# Patient Record
Sex: FEMALE | Race: WHITE | Employment: OTHER | URBAN - METROPOLITAN AREA
[De-identification: names, ages, dates, MRNs, and addresses within clinical notes are randomized per-mention and may not be internally consistent; named-entity substitution may affect disease eponyms.]

---

## 2017-03-01 ENCOUNTER — HOSPITAL ENCOUNTER (EMERGENCY)
Age: 67
Discharge: HOME | End: 2017-03-01
Attending: EMERGENCY MEDICINE | Admitting: EMERGENCY MEDICINE
Payer: MEDICARE

## 2017-03-01 ENCOUNTER — APPOINTMENT (OUTPATIENT)
Dept: GENERAL RADIOLOGY | Age: 67
End: 2017-03-01
Attending: EMERGENCY MEDICINE
Payer: MEDICARE

## 2017-03-01 VITALS
OXYGEN SATURATION: 97 % | WEIGHT: 180 LBS | HEART RATE: 88 BPM | RESPIRATION RATE: 20 BRPM | DIASTOLIC BLOOD PRESSURE: 82 MMHG | BODY MASS INDEX: 31.89 KG/M2 | TEMPERATURE: 97.5 F | HEIGHT: 63 IN | SYSTOLIC BLOOD PRESSURE: 134 MMHG

## 2017-03-01 DIAGNOSIS — J10.1 INFLUENZA A: Primary | ICD-10-CM

## 2017-03-01 LAB
ANION GAP BLD CALC-SCNC: 10 MMOL/L (ref 3–18)
ATRIAL RATE: 95 BPM
BASOPHILS # BLD AUTO: 0 K/UL (ref 0–0.06)
BASOPHILS # BLD: 0 % (ref 0–2)
BNP SERPL-MCNC: 427 PG/ML (ref 0–900)
BUN SERPL-MCNC: 24 MG/DL (ref 7–18)
BUN/CREAT SERPL: 17 (ref 12–20)
CALCIUM SERPL-MCNC: 8.8 MG/DL (ref 8.5–10.1)
CALCULATED P AXIS, ECG09: 79 DEGREES
CALCULATED R AXIS, ECG10: -5 DEGREES
CALCULATED T AXIS, ECG11: 86 DEGREES
CHLORIDE SERPL-SCNC: 105 MMOL/L (ref 100–108)
CK MB CFR SERPL CALC: 2 % (ref 0–4)
CK MB SERPL-MCNC: 1 NG/ML (ref 5–25)
CK SERPL-CCNC: 51 U/L (ref 26–192)
CO2 SERPL-SCNC: 22 MMOL/L (ref 21–32)
CREAT SERPL-MCNC: 1.4 MG/DL (ref 0.6–1.3)
DIAGNOSIS, 93000: NORMAL
DIFFERENTIAL METHOD BLD: ABNORMAL
EOSINOPHIL # BLD: 0.1 K/UL (ref 0–0.4)
EOSINOPHIL NFR BLD: 2 % (ref 0–5)
ERYTHROCYTE [DISTWIDTH] IN BLOOD BY AUTOMATED COUNT: 14.2 % (ref 11.6–14.5)
FLUAV AG NPH QL IA: POSITIVE
FLUBV AG NOSE QL IA: NEGATIVE
GLUCOSE SERPL-MCNC: 84 MG/DL (ref 74–99)
HCT VFR BLD AUTO: 39.5 % (ref 35–45)
HGB BLD-MCNC: 13.1 G/DL (ref 12–16)
LYMPHOCYTES # BLD AUTO: 16 % (ref 21–52)
LYMPHOCYTES # BLD: 1.1 K/UL (ref 0.9–3.6)
MCH RBC QN AUTO: 29 PG (ref 24–34)
MCHC RBC AUTO-ENTMCNC: 33.2 G/DL (ref 31–37)
MCV RBC AUTO: 87.6 FL (ref 74–97)
MONOCYTES # BLD: 0.8 K/UL (ref 0.05–1.2)
MONOCYTES NFR BLD AUTO: 12 % (ref 3–10)
NEUTS SEG # BLD: 4.7 K/UL (ref 1.8–8)
NEUTS SEG NFR BLD AUTO: 70 % (ref 40–73)
P-R INTERVAL, ECG05: 168 MS
PLATELET # BLD AUTO: 154 K/UL (ref 135–420)
PMV BLD AUTO: 11.7 FL (ref 9.2–11.8)
POTASSIUM SERPL-SCNC: 4.4 MMOL/L (ref 3.5–5.5)
Q-T INTERVAL, ECG07: 378 MS
QRS DURATION, ECG06: 88 MS
QTC CALCULATION (BEZET), ECG08: 475 MS
RBC # BLD AUTO: 4.51 M/UL (ref 4.2–5.3)
SODIUM SERPL-SCNC: 137 MMOL/L (ref 136–145)
TROPONIN I SERPL-MCNC: <0.02 NG/ML (ref 0–0.06)
VENTRICULAR RATE, ECG03: 95 BPM
WBC # BLD AUTO: 6.7 K/UL (ref 4.6–13.2)

## 2017-03-01 PROCEDURE — 93005 ELECTROCARDIOGRAM TRACING: CPT

## 2017-03-01 PROCEDURE — 87804 INFLUENZA ASSAY W/OPTIC: CPT | Performed by: EMERGENCY MEDICINE

## 2017-03-01 PROCEDURE — 99283 EMERGENCY DEPT VISIT LOW MDM: CPT

## 2017-03-01 PROCEDURE — 82550 ASSAY OF CK (CPK): CPT | Performed by: EMERGENCY MEDICINE

## 2017-03-01 PROCEDURE — 71020 XR CHEST PA LAT: CPT

## 2017-03-01 PROCEDURE — 83880 ASSAY OF NATRIURETIC PEPTIDE: CPT | Performed by: PHYSICIAN ASSISTANT

## 2017-03-01 PROCEDURE — 74011250636 HC RX REV CODE- 250/636: Performed by: PHYSICIAN ASSISTANT

## 2017-03-01 PROCEDURE — 80048 BASIC METABOLIC PNL TOTAL CA: CPT | Performed by: EMERGENCY MEDICINE

## 2017-03-01 PROCEDURE — 85025 COMPLETE CBC W/AUTO DIFF WBC: CPT | Performed by: EMERGENCY MEDICINE

## 2017-03-01 RX ORDER — TEMAZEPAM 30 MG/1
30 CAPSULE ORAL
COMMUNITY

## 2017-03-01 RX ORDER — OSELTAMIVIR PHOSPHATE 75 MG/1
75 CAPSULE ORAL 2 TIMES DAILY
Qty: 10 CAP | Refills: 0 | Status: SHIPPED | OUTPATIENT
Start: 2017-03-01 | End: 2017-03-06

## 2017-03-01 RX ORDER — DIAZEPAM 5 MG/1
5 TABLET ORAL
COMMUNITY

## 2017-03-01 RX ORDER — LISINOPRIL 5 MG/1
5 TABLET ORAL DAILY
COMMUNITY

## 2017-03-01 RX ADMIN — SODIUM CHLORIDE 500 ML: 900 INJECTION, SOLUTION INTRAVENOUS at 16:47

## 2017-03-01 NOTE — ED PROVIDER NOTES
HPI Comments: Kari Victoria is a 79 y.o. female with a pertinent history of HTN, pSVT, CHF who presents to the emergency department for evaluation of dry cough, chills, body aches, nasal congestion, sore throat, and HA x 4 days. She states \"I just feel so sick. \"  No recent ill contacts. Has not had a fever, but states she never does. One episode of loose stools last night. Pt denies any dizziness or light headedness, CP or discomfort, SOB,  n/v/c, abd pain, back pain, diaphoresis, melena/hematochezia, dysuria, hematuria, frequency, focal weakness/numbness/tingling, or rash. Patient has no other complaints at this time. PCP: Zenia Broussard MD        Patient is a 79 y.o. female presenting with cough, headaches, sore throat, chills, and shortness of breath. Cough   Associated symptoms include chills, headaches, rhinorrhea, sore throat, myalgias and shortness of breath. Pertinent negatives include no chest pain, no nausea and no vomiting. Headache    Associated symptoms include shortness of breath. Pertinent negatives include no fever, no weakness, no dizziness, no nausea and no vomiting. Sore Throat    Associated symptoms include diarrhea, congestion, headaches, shortness of breath and cough. Pertinent negatives include no vomiting. Chills    Associated symptoms include diarrhea, congestion, headaches, sore throat, cough and shortness of breath. Pertinent negatives include no chest pain, no vomiting and no rash. Shortness of Breath   Associated symptoms include headaches, rhinorrhea, sore throat and cough. Pertinent negatives include no fever, no chest pain, no vomiting, no abdominal pain and no rash. Past Medical History:   Diagnosis Date    CC (collagenous colitis)     Chronic combined systolic and diastolic heart failure ([facility] [address] 7/18/2013    sob on exertion,class3     Endometriosis     Essential hypertension, benign     The hypertension is stable.   UNABLE TO TOLERATE BETA BLOCKER, PT BACK ON LISINOPRIL 10 MG A DAY.  Essential hypertension, benign     The hypertension is stable. UNABLE TO TOLERATE BETA BLOCKER, PT BACK ON LISINOPRIL 10 MG A DAY.  Hypertension     Hypothyroid     Mitral valve insufficiency and aortic valve insufficiency     Mild AI/mild MR, no symptoms    Mitral valve insufficiency and aortic valve insufficiency     Mild AI/mild MR, no symptoms     Neuropathy     Other primary cardiomyopathies ([address] 2013    ef decreased again to 35-40% unclear non ischemic etiology     Other specified cardiac dysrhythmias(427.89)     OCC PVC, one 4 beat SVT    Palpitations     10/12 recurrent post ablation of SVT in ; EKG with S Tach in 130s today; obtain results of recent labs from PCP, obtain results if recent Holter from Dr. Christiana Chauhan Paroxysmal supraventricular tachycardia ([facility] [address]      ablation    Paroxysmal supraventricular tachycardia ([facility] [address]      ablation        Past Surgical History:   Procedure Laterality Date    CARDIAC SURG PROCEDURE UNLIST      ablation;  for SVT - slow pathway; Dr. Dejah Lidnsey    HX BREAST REDUCTION      HX CARPAL TUNNEL RELEASE      HX  SECTION      x 2     HX CHOLECYSTECTOMY      HX DILATION AND CURETTAGE      HX HERNIA REPAIR      Incisional herniorrhaphy    HX ORTHOPAEDIC      right ankle and left foot    HX TONSILLECTOMY           Family History:   Problem Relation Age of Onset    Heart Attack Other     Hypertension Other     Heart Attack Mother 52    Heart Attack Father 61    Stroke Neg Hx     Sudden Death Neg Hx        Social History     Social History    Marital status:      Spouse name: N/A    Number of children: N/A    Years of education: N/A     Occupational History    Not on file.      Social History Main Topics    Smoking status: Never Smoker    Smokeless tobacco: Not on file    Alcohol use No    Drug use: No    Sexual activity: Not on file     Other Topics Concern    Not on file     Social History Narrative         ALLERGIES: Latex; Neuromuscular blockers, steroidal; Adhesive; Augmentin [amoxicillin-pot clavulanate]; Coreg [carvedilol]; Digoxin; Metoprolol; and Toprol xl [metoprolol succinate]    Review of Systems   Constitutional: Positive for chills. Negative for fever. HENT: Positive for congestion, rhinorrhea and sore throat. Respiratory: Positive for cough and shortness of breath. Cardiovascular: Negative for chest pain. Gastrointestinal: Positive for diarrhea. Negative for abdominal pain, blood in stool, constipation, nausea and vomiting. Genitourinary: Negative for dysuria, frequency and hematuria. Musculoskeletal: Positive for myalgias. Negative for back pain. Skin: Negative for rash and wound. Neurological: Positive for headaches. Negative for dizziness, weakness and light-headedness. Vitals:    03/01/17 1232   BP: 136/85   Pulse: 95   Resp: 20   Temp: 97.5 °F (36.4 °C)   SpO2: 98%   Weight: 81.6 kg (180 lb)   Height: 5' 3\" (1.6 m)            Physical Exam   Constitutional: She is oriented to person, place, and time. She appears well-developed and well-nourished. No distress. HENT:   Head: Normocephalic and atraumatic. Nose: Rhinorrhea present. Mouth/Throat: Oropharynx is clear and moist. No oropharyngeal exudate. Eyes: Conjunctivae are normal. Right eye exhibits no discharge. Left eye exhibits no discharge. Neck: Normal range of motion. Neck supple. No thyromegaly present. Cardiovascular: Normal rate and intact distal pulses. Exam reveals no gallop and no friction rub. No murmur heard. Pulmonary/Chest: Effort normal and breath sounds normal. No respiratory distress. She has no wheezes. She has no rales. She exhibits no tenderness. Lungs CTAB   Lymphadenopathy:     She has no cervical adenopathy. Neurological: She is alert and oriented to person, place, and time. Skin: Skin is warm and dry. No rash noted. She is not diaphoretic.    Psychiatric: She has a normal mood and affect. Her behavior is normal.   Nursing note and vitals reviewed. MDM  Number of Diagnoses or Management Options  Influenza A: new and requires workup  Diagnosis management comments: Differential Diagnosis:  influenza, mononucleosis, acute bronchitis, URI, streptococcal pharyngitis, pertussis, pneumonia, asthma exacerbation, allergic rhinitis      Plan:  Pt presents in NAD, vitals wnl. Exam and HPI consistent with viral URI. Flu positive. Mildly worsened GFR - patient states she feels dehydrated. Labs are otherwise unremarkable. CXR negative. Given comorbidities, will treat with tamiflu. Small bolus here. At this time, patient is stable and appropriate for discharge home. Patient demonstrates understanding of current diagnoses and is in agreement with the treatment plan. They are advised that while the likelihood of serious underlying condition is low at this point given the evaluation performed today, we cannot fully rule it out. They are advised to immediately return with any new symptoms or worsening of current condition. All questions have been answered. Patient is given educational material regarding their diagnoses, including danger symptoms and when to return to the ED.   Follow-up with PCP         Amount and/or Complexity of Data Reviewed  Clinical lab tests: ordered and reviewed  Tests in the radiology section of CPT®: ordered and reviewed  Review and summarize past medical records: yes    Risk of Complications, Morbidity, and/or Mortality  Presenting problems: moderate  Diagnostic procedures: moderate  Management options: moderate    Patient Progress  Patient progress: improved    ED Course       Procedures    -------------------------------------------------------------------------------------------------------------------  Orders:  Orders Placed This Encounter    INFLUENZA A & B AG (RAPID TEST)     Standing Status:   Standing     Number of Occurrences:   1    XR CHEST PA LAT     Standing Status:   Standing     Number of Occurrences:   1     Order Specific Question:   Transport     Answer:   Ambulatory [1]     Order Specific Question:   Reason for Exam     Answer:   short of breath    CBC WITH AUTOMATED DIFF     Standing Status:   Standing     Number of Occurrences:   1    METABOLIC PANEL, BASIC     Standing Status:   Standing     Number of Occurrences:   1    CARDIAC PANEL,(CK, CKMB & TROPONIN)     Standing Status:   Standing     Number of Occurrences:   1    Pro BNP     Standing Status:   Standing     Number of Occurrences:   1    CARDIAC MONITORING     Standing Status:   Standing     Number of Occurrences:   1     Order Specific Question:   Type: Answer:   Bedside    EKG, 12 LEAD, INITIAL     Standing Status:   Standing     Number of Occurrences:   1     Order Specific Question:   Reason for Exam:     Answer:   short of breath    lisinopril (PRINIVIL, ZESTRIL) 5 mg tablet     Sig: Take 5 mg by mouth daily.  diazePAM (VALIUM) 5 mg tablet     Sig: Take 5 mg by mouth every six (6) hours as needed for Anxiety.  temazepam (RESTORIL) 30 mg capsule     Sig: Take 30 mg by mouth nightly as needed for Sleep.  sodium chloride 0.9 % bolus infusion 500 mL    oseltamivir (TAMIFLU) 75 mg capsule     Sig: Take 1 Cap by mouth two (2) times a day for 5 days.      Dispense:  10 Cap     Refill:  0        Lab Results:   Recent Results (from the past 12 hour(s))   EKG, 12 LEAD, INITIAL    Collection Time: 03/01/17 12:46 PM   Result Value Ref Range    Ventricular Rate 95 BPM    Atrial Rate 95 BPM    P-R Interval 168 ms    QRS Duration 88 ms    Q-T Interval 378 ms    QTC Calculation (Bezet) 475 ms    Calculated P Axis 79 degrees    Calculated R Axis -5 degrees    Calculated T Axis 86 degrees    Diagnosis       Normal sinus rhythm  Prolonged QT  Abnormal ECG  When compared with ECG of 30-OCT-2015 11:36,  No significant change was found     CBC WITH AUTOMATED DIFF Collection Time: 03/01/17  1:05 PM   Result Value Ref Range    WBC 6.7 4.6 - 13.2 K/uL    RBC 4.51 4.20 - 5.30 M/uL    HGB 13.1 12.0 - 16.0 g/dL    HCT 39.5 35.0 - 45.0 %    MCV 87.6 74.0 - 97.0 FL    MCH 29.0 24.0 - 34.0 PG    MCHC 33.2 31.0 - 37.0 g/dL    RDW 14.2 11.6 - 14.5 %    PLATELET 224 392 - 919 K/uL    MPV 11.7 9.2 - 11.8 FL    NEUTROPHILS 70 40 - 73 %    LYMPHOCYTES 16 (L) 21 - 52 %    MONOCYTES 12 (H) 3 - 10 %    EOSINOPHILS 2 0 - 5 %    BASOPHILS 0 0 - 2 %    ABS. NEUTROPHILS 4.7 1.8 - 8.0 K/UL    ABS. LYMPHOCYTES 1.1 0.9 - 3.6 K/UL    ABS. MONOCYTES 0.8 0.05 - 1.2 K/UL    ABS. EOSINOPHILS 0.1 0.0 - 0.4 K/UL    ABS. BASOPHILS 0.0 0.0 - 0.06 K/UL    DF AUTOMATED     METABOLIC PANEL, BASIC    Collection Time: 03/01/17  1:05 PM   Result Value Ref Range    Sodium 137 136 - 145 mmol/L    Potassium 4.4 3.5 - 5.5 mmol/L    Chloride 105 100 - 108 mmol/L    CO2 22 21 - 32 mmol/L    Anion gap 10 3.0 - 18 mmol/L    Glucose 84 74 - 99 mg/dL    BUN 24 (H) 7.0 - 18 MG/DL    Creatinine 1.40 (H) 0.6 - 1.3 MG/DL    BUN/Creatinine ratio 17 12 - 20      GFR est AA 45 (L) >60 ml/min/1.73m2    GFR est non-AA 38 (L) >60 ml/min/1.73m2    Calcium 8.8 8.5 - 10.1 MG/DL   CARDIAC PANEL,(CK, CKMB & TROPONIN)    Collection Time: 03/01/17  1:05 PM   Result Value Ref Range    CK 51 26 - 192 U/L    CK - MB 1.0 <3.6 ng/ml    CK-MB Index 2.0 0.0 - 4.0 %    Troponin-I, Qt. <0.02 0.00 - 0.06 NG/ML   PRO-BNP    Collection Time: 03/01/17  1:05 PM   Result Value Ref Range    NT pro- 0 - 900 PG/ML   INFLUENZA A & B AG (RAPID TEST)    Collection Time: 03/01/17  1:15 PM   Result Value Ref Range    Influenza A Antigen POSITIVE (A) NEG      Influenza B Antigen NEGATIVE  NEG         Radiology Results:  XR CHEST PA LAT   Final Result   IMPRESSION:  No radiographic evidence for acute cardiopulmonary process. .       Progress Notes:  4:49 PM:  Rosa Maria Lemus PA-C was at the pt's bedside, assessed the pt and answered the pt's questions regarding treatment.    -------------------------------------------------------------------------------------------------------------------    Disposition:  Diagnosis:   1. Influenza A        Disposition: [place] Home    Follow-up Information     Follow up With Details Comments Contact Info    Filiberto Ornelas MD Call in 2 days For follow-up [address] [facility]      [id number] [place] [address] EMERGENCY DEPT Go to As needed, If symptoms worsen [address] [facility]  [phone]          Patient's Medications   Start Taking    OSELTAMIVIR (TAMIFLU) 75 MG CAPSULE    Take 1 Cap by mouth two (2) times a day for 5 days. Continue Taking    BUPROPION SR (WELLBUTRIN SR) 150 MG SR TABLET    Take  by mouth two (2) times a day. DESVENLAFAXINE SR (PRISTIQ) 50 MG TABLET    Take 50 mg by mouth daily. DIAZEPAM (VALIUM) 5 MG TABLET    Take 5 mg by mouth every six (6) hours as needed for Anxiety. FOLIC ACID (FOLVITE) 1 MG TABLET    Take  by mouth daily. LAMOTRIGINE (LAMICTAL) 100 MG TABLET    Take 100 mg by mouth daily. LISINOPRIL (PRINIVIL, ZESTRIL) 5 MG TABLET    Take 5 mg by mouth daily. OMEPRAZOLE (PRILOSEC) 40 MG CAPSULE    Take 40 mg by mouth daily. TEMAZEPAM (RESTORIL) 30 MG CAPSULE    Take 30 mg by mouth nightly as needed for Sleep. These Medications have changed    No medications on file   Stop Taking    ALPRAZOLAM (XANAX) 2 MG TABLET    Take  by mouth daily. Takes 1.5 daily. AMITRIPTYLINE (ELAVIL) 25 MG TABLET    Take 50 mg by mouth nightly. ATENOLOL (TENORMIN) 50 MG TABLET    Take 1 Tab by mouth daily. BUSPIRONE (BUSPAR) 15 MG TABLET    Take 15 mg by mouth three (3) times daily. FAMOTIDINE (PEPCID) 40 MG TABLET    Take 40 mg by mouth two (2) times a day. FUROSEMIDE (LASIX) 20 MG TABLET    Take 1 Tab by mouth daily as needed (SWELLING). LOSARTAN (COZAAR) 50 MG TABLET    Take  by mouth daily.

## 2017-03-01 NOTE — ED TRIAGE NOTES
Patient states onset of cough, chills, body aches, sore throat and shortness of breath on Saturday. She states hx of cardiac valve replacement. Patient states:  \" I am always short of breath, but this is worse\".

## 2017-03-01 NOTE — DISCHARGE INSTRUCTIONS
Influenza (Flu): Care Instructions  Your Care Instructions  Influenza (flu) is an infection in the lungs and breathing passages. It is caused by the influenza virus. There are different strains, or types, of the flu virus from year to year. Unlike the common cold, the flu comes on suddenly and the symptoms, such as a cough, congestion, fever, chills, fatigue, aches, and pains, are more severe. These symptoms may last up to 10 days. Although the flu can make you feel very sick, it usually doesn't cause serious health problems. Home treatment is usually all you need for flu symptoms. But your doctor may prescribe antiviral medicine to prevent other health problems, such as pneumonia, from developing. Older people and those who have a long-term health condition, such as lung disease, are most at risk for having pneumonia or other health problems. Follow-up care is a key part of your treatment and safety. Be sure to make and go to all appointments, and call your doctor if you are having problems. Its also a good idea to know your test results and keep a list of the medicines you take. How can you care for yourself at home? · Get plenty of rest.  · Drink plenty of fluids, enough so that your urine is light yellow or clear like water. If you have kidney, heart, or liver disease and have to limit fluids, talk with your doctor before you increase the amount of fluids you drink. · Take an over-the-counter pain medicine if needed, such as acetaminophen (Tylenol), ibuprofen (Advil, Motrin), or naproxen (Aleve), to relieve fever, headache, and muscle aches. Read and follow all instructions on the label. No one younger than 20 should take aspirin. It has been linked to Reye syndrome, a serious illness. · Do not smoke. Smoking can make the flu worse. If you need help quitting, talk to your doctor about stop-smoking programs and medicines. These can increase your chances of quitting for good.   · Breathe moist air from a hot shower or from a sink filled with hot water to help clear a stuffy nose. · Before you use cough and cold medicines, check the label. These medicines may not be safe for young children or for people with certain health problems. · If the skin around your nose and lips becomes sore, put some petroleum jelly on the area. · To ease coughing:  ¨ Drink fluids to soothe a scratchy throat. ¨ Suck on cough drops or plain hard candy. ¨ Take an over-the-counter cough medicine that contains dextromethorphan to help you get some sleep. Read and follow all instructions on the label. ¨ Raise your head at night with an extra pillow. This may help you rest if coughing keeps you awake. · Take any prescribed medicine exactly as directed. Call your doctor if you think you are having a problem with your medicine. To avoid spreading the flu  · Wash your hands regularly, and keep your hands away from your face. · Stay home from school, work, and other public places until you are feeling better and your fever has been gone for at least 24 hours. The fever needs to have gone away on its own without the help of medicine. · Ask people living with you to talk to their doctors about preventing the flu. They may get antiviral medicine to keep from getting the flu from you. · To prevent the flu in the future, get a flu vaccine every fall. Encourage people living with you to get the vaccine. · Cover your mouth when you cough or sneeze. When should you call for help? Call 911 anytime you think you may need emergency care. For example, call if:  · You have severe trouble breathing. Call your doctor now or seek immediate medical care if:  · You have new or worse trouble breathing. · You seem to be getting much sicker. · You feel very sleepy or confused. · You have a new or higher fever. · You get a new rash.   Watch closely for changes in your health, and be sure to contact your doctor if:  · You begin to get better and then get worse. · You are not getting better after 1 week. Where can you learn more? Go to http://tristan-audra.info/. Enter W444 in the search box to learn more about \"Influenza (Flu): Care Instructions. \"  Current as of: May 23, 2016  Content Version: 11.1  © 2050-1783 [employer]. Care instructions adapted under license by [employer] (which disclaims liability or warranty for this information). If you have questions about a medical condition or this instruction, always ask your healthcare professional. [address] any warranty or liability for your use of this information.

## 2017-07-11 ENCOUNTER — HOSPITAL ENCOUNTER (EMERGENCY)
Age: 67
Discharge: HOME | End: 2017-07-11
Attending: EMERGENCY MEDICINE | Admitting: EMERGENCY MEDICINE
Payer: MEDICARE

## 2017-07-11 ENCOUNTER — APPOINTMENT (OUTPATIENT)
Dept: GENERAL RADIOLOGY | Age: 67
End: 2017-07-11
Attending: NURSE PRACTITIONER
Payer: MEDICARE

## 2017-07-11 VITALS
DIASTOLIC BLOOD PRESSURE: 79 MMHG | BODY MASS INDEX: 32.11 KG/M2 | TEMPERATURE: 98 F | HEIGHT: 63 IN | SYSTOLIC BLOOD PRESSURE: 143 MMHG | WEIGHT: 181.2 LBS | HEART RATE: 91 BPM | OXYGEN SATURATION: 100 % | RESPIRATION RATE: 18 BRPM

## 2017-07-11 DIAGNOSIS — R53.83 FATIGUE, UNSPECIFIED TYPE: Primary | ICD-10-CM

## 2017-07-11 LAB
ANION GAP BLD CALC-SCNC: 8 MMOL/L (ref 3–18)
APPEARANCE UR: CLEAR
BASOPHILS # BLD AUTO: 0 K/UL (ref 0–0.06)
BASOPHILS # BLD: 1 % (ref 0–2)
BILIRUB UR QL: NEGATIVE
BNP SERPL-MCNC: 369 PG/ML (ref 0–900)
BUN SERPL-MCNC: 15 MG/DL (ref 7–18)
BUN/CREAT SERPL: 11 (ref 12–20)
CALCIUM SERPL-MCNC: 8.7 MG/DL (ref 8.5–10.1)
CHLORIDE SERPL-SCNC: 105 MMOL/L (ref 100–108)
CK MB CFR SERPL CALC: 1.3 % (ref 0–4)
CK MB SERPL-MCNC: 2.4 NG/ML (ref 5–25)
CK SERPL-CCNC: 188 U/L (ref 26–192)
CO2 SERPL-SCNC: 26 MMOL/L (ref 21–32)
COLOR UR: YELLOW
CREAT SERPL-MCNC: 1.33 MG/DL (ref 0.6–1.3)
DIFFERENTIAL METHOD BLD: NORMAL
EOSINOPHIL # BLD: 0 K/UL (ref 0–0.4)
EOSINOPHIL NFR BLD: 0 % (ref 0–5)
ERYTHROCYTE [DISTWIDTH] IN BLOOD BY AUTOMATED COUNT: 13.7 % (ref 11.6–14.5)
GLUCOSE BLD STRIP.AUTO-MCNC: 93 MG/DL (ref 70–110)
GLUCOSE SERPL-MCNC: 90 MG/DL (ref 74–99)
GLUCOSE UR STRIP.AUTO-MCNC: NEGATIVE MG/DL
HCT VFR BLD AUTO: 41.5 % (ref 35–45)
HGB BLD-MCNC: 13.8 G/DL (ref 12–16)
HGB UR QL STRIP: NEGATIVE
KETONES UR QL STRIP.AUTO: NEGATIVE MG/DL
LEUKOCYTE ESTERASE UR QL STRIP.AUTO: NEGATIVE
LYMPHOCYTES # BLD AUTO: 27 % (ref 21–52)
LYMPHOCYTES # BLD: 1.7 K/UL (ref 0.9–3.6)
MCH RBC QN AUTO: 28.6 PG (ref 24–34)
MCHC RBC AUTO-ENTMCNC: 33.3 G/DL (ref 31–37)
MCV RBC AUTO: 86.1 FL (ref 74–97)
MONOCYTES # BLD: 0.6 K/UL (ref 0.05–1.2)
MONOCYTES NFR BLD AUTO: 10 % (ref 3–10)
NEUTS SEG # BLD: 3.8 K/UL (ref 1.8–8)
NEUTS SEG NFR BLD AUTO: 62 % (ref 40–73)
NITRITE UR QL STRIP.AUTO: NEGATIVE
PH UR STRIP: 7 [PH] (ref 5–8)
PLATELET # BLD AUTO: 172 K/UL (ref 135–420)
PMV BLD AUTO: 11.1 FL (ref 9.2–11.8)
POTASSIUM SERPL-SCNC: 4.1 MMOL/L (ref 3.5–5.5)
PROT UR STRIP-MCNC: NEGATIVE MG/DL
RBC # BLD AUTO: 4.82 M/UL (ref 4.2–5.3)
SODIUM SERPL-SCNC: 139 MMOL/L (ref 136–145)
SP GR UR REFRACTOMETRY: 1.01 (ref 1–1.03)
TROPONIN I SERPL-MCNC: <0.02 NG/ML (ref 0–0.06)
TSH SERPL DL<=0.05 MIU/L-ACNC: 2.4 UIU/ML (ref 0.36–3.74)
UROBILINOGEN UR QL STRIP.AUTO: 0.2 EU/DL (ref 0.2–1)
WBC # BLD AUTO: 6.1 K/UL (ref 4.6–13.2)

## 2017-07-11 PROCEDURE — 71020 XR CHEST PA LAT: CPT

## 2017-07-11 PROCEDURE — 84443 ASSAY THYROID STIM HORMONE: CPT | Performed by: NURSE PRACTITIONER

## 2017-07-11 PROCEDURE — 85025 COMPLETE CBC W/AUTO DIFF WBC: CPT | Performed by: NURSE PRACTITIONER

## 2017-07-11 PROCEDURE — 74011250636 HC RX REV CODE- 250/636: Performed by: NURSE PRACTITIONER

## 2017-07-11 PROCEDURE — 82550 ASSAY OF CK (CPK): CPT | Performed by: NURSE PRACTITIONER

## 2017-07-11 PROCEDURE — 99284 EMERGENCY DEPT VISIT MOD MDM: CPT

## 2017-07-11 PROCEDURE — 80048 BASIC METABOLIC PNL TOTAL CA: CPT | Performed by: NURSE PRACTITIONER

## 2017-07-11 PROCEDURE — 82962 GLUCOSE BLOOD TEST: CPT

## 2017-07-11 PROCEDURE — 83880 ASSAY OF NATRIURETIC PEPTIDE: CPT | Performed by: NURSE PRACTITIONER

## 2017-07-11 PROCEDURE — 93005 ELECTROCARDIOGRAM TRACING: CPT

## 2017-07-11 PROCEDURE — 81003 URINALYSIS AUTO W/O SCOPE: CPT | Performed by: NURSE PRACTITIONER

## 2017-07-11 RX ORDER — FLUOXETINE HYDROCHLORIDE 20 MG/1
20 CAPSULE ORAL DAILY
COMMUNITY

## 2017-07-11 RX ADMIN — SODIUM CHLORIDE 500 ML: 900 INJECTION, SOLUTION INTRAVENOUS at 17:15

## 2017-07-11 NOTE — ED PROVIDER NOTES
HPI Comments:   4:24 PM   79 y.o. female presents to ED C/O fatigue, thirst.  Patient has a HX of hypothyroid, HTN, neuropathy,  HTN, mitral valve insufficiency, aortic insufficiency, SVT, CHF, cholecystectomy, hernia repair, cardiac ablation, breast reduction. Patient reports increased thirst x 2 weeks, associated with feeling fatigue/ tired, and feeling like she is shaking on the inside. Patient reports it takes her long to find the right word but denies confusion. Patient denies abdominal pain, SOB, CP, N/V/D, dysuria. Patient is a nonsmoker. Pt denies any other sxs or complaints. Written by Apollo MCCORMICK      The history is provided by the patient. History limited by: No language barrier. Past Medical History:   Diagnosis Date    CC (collagenous colitis)     Chronic combined systolic and diastolic heart failure ([address] 7/18/2013    sob on exertion,class3     Endometriosis     Essential hypertension, benign     The hypertension is stable. UNABLE TO TOLERATE BETA BLOCKER, PT BACK ON LISINOPRIL 10 MG A DAY.  Essential hypertension, benign     The hypertension is stable. UNABLE TO TOLERATE BETA BLOCKER, PT BACK ON LISINOPRIL 10 MG A DAY.      Hypertension     Hypothyroid     Mitral valve insufficiency and aortic valve insufficiency     Mild AI/mild MR, no symptoms    Mitral valve insufficiency and aortic valve insufficiency     Mild AI/mild MR, no symptoms     Neuropathy (HCC)     Other primary cardiomyopathies 7/18/2013    ef decreased again to 35-40% unclear non ischemic etiology     Other specified cardiac dysrhythmias     OCC PVC, one 4 beat SVT    Palpitations     10/12 recurrent post ablation of SVT in 9/12; EKG with S Tach in 130s today; obtain results of recent labs from PCP, obtain results if recent Holter from Dr. Sandie Ho Paroxysmal supraventricular tachycardia ([address]     9/12 ablation    Paroxysmal supraventricular tachycardia ([address]     9/12 ablation        Past Surgical History:   Procedure Laterality Date    CARDIAC SURG PROCEDURE UNLIST      ablation;  for SVT - slow pathway; Dr. Panda Boswell    HX BREAST REDUCTION      HX CARPAL TUNNEL RELEASE      HX  SECTION      x 2     HX CHOLECYSTECTOMY      HX DILATION AND CURETTAGE      HX HERNIA REPAIR      Incisional herniorrhaphy    HX ORTHOPAEDIC      right ankle and left foot    HX TONSILLECTOMY           Family History:   Problem Relation Age of Onset    Heart Attack Mother 52    Heart Attack Father 61    Heart Attack Other     Hypertension Other     Stroke Neg Hx     Sudden Death Neg Hx        Social History     Social History    Marital status:      Spouse name: N/A    Number of children: N/A    Years of education: N/A     Occupational History    Not on file. Social History Main Topics    Smoking status: Never Smoker    Smokeless tobacco: Not on file    Alcohol use No    Drug use: No    Sexual activity: Not on file     Other Topics Concern    Not on file     Social History Narrative         ALLERGIES: Latex; Neuromuscular blockers, steroidal; Adhesive; Augmentin [amoxicillin-pot clavulanate]; Coreg [carvedilol]; Digoxin; Metoprolol; and Toprol xl [metoprolol succinate]    Review of Systems   Constitutional: Positive for appetite change and fatigue. Negative for fever. HENT: Negative for congestion, rhinorrhea and sore throat. Respiratory: Negative for cough, shortness of breath and wheezing. Cardiovascular: Negative for chest pain and leg swelling. Gastrointestinal: Negative for abdominal pain, constipation, diarrhea, nausea and vomiting. Genitourinary: Negative for dysuria. Musculoskeletal: Negative for arthralgias and back pain. Neurological: Negative for dizziness, syncope and headaches.        Vitals:    17 1606 17 1733   BP: 134/88 143/79   Pulse: (!) 112 91   Resp: 20 18   Temp: 98 °F (36.7 °C)    SpO2: 100% 100%   Weight: 82.2 kg (181 lb 3.2 oz) Height: 5' 3\" (1.6 m)             Physical Exam   Constitutional: She is oriented to person, place, and time. She appears well-developed and well-nourished. No distress. HENT:   Mouth/Throat: Mucous membranes are dry. Eyes: Conjunctivae and EOM are normal.   Cardiovascular: Normal rate and regular rhythm. Murmur heard. Pulmonary/Chest: Effort normal and breath sounds normal. No respiratory distress. She has no wheezes. She has no rales. Abdominal: Soft. Bowel sounds are normal. She exhibits no distension. There is no tenderness. There is no rebound and no guarding. NO CVA tenderness to percussion    Musculoskeletal: Normal range of motion. Neurological: She is alert and oriented to person, place, and time. She exhibits normal muscle tone. Coordination normal.   NIHSS - 0   Skin: Skin is warm and dry. No rash noted. She is not diaphoretic. No erythema. No pallor. Nursing note and vitals reviewed. MDM  Number of Diagnoses or Management Options  Fatigue, unspecified type:   Diagnosis management comments: DDX - dehydration, anemia, depression, medication reaction, electrolyte abnormality, CHF, ACS, hypothyroid    MDM:  Plan - CBC, BMP, UA , cardiac panel, EKG, chest xray, TSH  Progress - patient's UA and CBC is normal, no abnormal findings, BMP - renal function improved form previous, TSH is WNL, cardiac panel is WNL, chest xray - no acute cardiopulmonary finding, BNP is normal  6:05 PM Patient informed of results and given a copy. Extensive discussion with patient regarding results and possible cause of symptoms. Patient recently started prozac approx 8 weeks ago - referred her back to psych for further evaluation, possible medication reaction. Patient educated to return to the ED for any new or worsening symptoms. Patient denies questions.         Amount and/or Complexity of Data Reviewed  Clinical lab tests: ordered and reviewed  Tests in the radiology section of CPT®: ordered and reviewed      ED Course       Procedures               RESULTS:    XR CHEST PA LAT   Final Result          Labs Reviewed   METABOLIC PANEL, BASIC - Abnormal; Notable for the following:        Result Value    Creatinine 1.33 (*)     BUN/Creatinine ratio 11 (*)     GFR est AA 48 (*)     GFR est non-AA 40 (*)     All other components within normal limits   CBC WITH AUTOMATED DIFF   URINALYSIS W/ RFLX MICROSCOPIC   TSH 3RD GENERATION   CARDIAC PANEL,(CK, CKMB & TROPONIN)   NT-PRO BNP   GLUCOSE, POC   POC GLUCOSE       Recent Results (from the past 12 hour(s))   GLUCOSE, POC    Collection Time: 07/11/17  4:15 PM   Result Value Ref Range    Glucose (POC) 93 70 - 110 mg/dL   CBC WITH AUTOMATED DIFF    Collection Time: 07/11/17  4:30 PM   Result Value Ref Range    WBC 6.1 4.6 - 13.2 K/uL    RBC 4.82 4.20 - 5.30 M/uL    HGB 13.8 12.0 - 16.0 g/dL    HCT 41.5 35.0 - 45.0 %    MCV 86.1 74.0 - 97.0 FL    MCH 28.6 24.0 - 34.0 PG    MCHC 33.3 31.0 - 37.0 g/dL    RDW 13.7 11.6 - 14.5 %    PLATELET 226 848 - 476 K/uL    MPV 11.1 9.2 - 11.8 FL    NEUTROPHILS 62 40 - 73 %    LYMPHOCYTES 27 21 - 52 %    MONOCYTES 10 3 - 10 %    EOSINOPHILS 0 0 - 5 %    BASOPHILS 1 0 - 2 %    ABS. NEUTROPHILS 3.8 1.8 - 8.0 K/UL    ABS. LYMPHOCYTES 1.7 0.9 - 3.6 K/UL    ABS. MONOCYTES 0.6 0.05 - 1.2 K/UL    ABS. EOSINOPHILS 0.0 0.0 - 0.4 K/UL    ABS.  BASOPHILS 0.0 0.0 - 0.06 K/UL    DF AUTOMATED     METABOLIC PANEL, BASIC    Collection Time: 07/11/17  4:30 PM   Result Value Ref Range    Sodium 139 136 - 145 mmol/L    Potassium 4.1 3.5 - 5.5 mmol/L    Chloride 105 100 - 108 mmol/L    CO2 26 21 - 32 mmol/L    Anion gap 8 3.0 - 18 mmol/L    Glucose 90 74 - 99 mg/dL    BUN 15 7.0 - 18 MG/DL    Creatinine 1.33 (H) 0.6 - 1.3 MG/DL    BUN/Creatinine ratio 11 (L) 12 - 20      GFR est AA 48 (L) >60 ml/min/1.73m2    GFR est non-AA 40 (L) >60 ml/min/1.73m2    Calcium 8.7 8.5 - 10.1 MG/DL   TSH 3RD GENERATION    Collection Time: 07/11/17  4:30 PM   Result Value Ref Range    TSH 2.40 0.36 - 3.74 uIU/mL   CARDIAC PANEL,(CK, CKMB & TROPONIN)    Collection Time: 07/11/17  4:30 PM   Result Value Ref Range     26 - 192 U/L    CK - MB 2.4 <3.6 ng/ml    CK-MB Index 1.3 0.0 - 4.0 %    Troponin-I, Qt. <0.02 0.00 - 0.06 NG/ML   NT-PRO BNP    Collection Time: 07/11/17  4:30 PM   Result Value Ref Range    NT pro- 0 - 900 PG/ML   EKG, 12 LEAD, INITIAL    Collection Time: 07/11/17  4:39 PM   Result Value Ref Range    Ventricular Rate 92 BPM    Atrial Rate 92 BPM    P-R Interval 164 ms    QRS Duration 86 ms    Q-T Interval 398 ms    QTC Calculation (Bezet) 492 ms    Calculated P Axis 67 degrees    Calculated R Axis -21 degrees    Calculated T Axis 77 degrees    Diagnosis       Normal sinus rhythm  Inferior infarct , age undetermined  Abnormal ECG  When compared with ECG of 01-MAR-2017 12:46,  No significant change was found     URINALYSIS W/ RFLX MICROSCOPIC    Collection Time: 07/11/17  4:55 PM   Result Value Ref Range    Color YELLOW      Appearance CLEAR      Specific gravity 1.008 1.005 - 1.030      pH (UA) 7.0 5.0 - 8.0      Protein NEGATIVE  NEG mg/dL    Glucose NEGATIVE  NEG mg/dL    Ketone NEGATIVE  NEG mg/dL    Bilirubin NEGATIVE  NEG      Blood NEGATIVE  NEG      Urobilinogen 0.2 0.2 - 1.0 EU/dL    Nitrites NEGATIVE  NEG      Leukocyte Esterase NEGATIVE  NEG         PROGRESS NOTE:   4:24 PM   Initial assessment completed. Written by Sergio MCCORMICK     DISCHARGE NOTE:  6:02 PM   Marcelo Roman  results have been reviewed with her. She has been counseled regarding her diagnosis, treatment, and plan. She verbally conveys understanding and agreement of the signs, symptoms, diagnosis, treatment and prognosis and additionally agrees to follow up as discussed. She also agrees with the care-plan and conveys that all of her questions have been answered.   I have also provided discharge instructions for her that include: educational information regarding their diagnosis and treatment, and list of reasons why they would want to return to the ED prior to their follow-up appointment, should her condition change. CLINICAL IMPRESSION:    1.  Fatigue, unspecified type        AFTER VISIT PLAN:    Current Discharge Medication List           Follow-up Information     Follow up With Details Comments Contact Info    [facility] Schedule an appointment as soon as possible for a visit in 1 week Further evaluation [address]           Written by Mirela CANASC

## 2017-07-11 NOTE — ED TRIAGE NOTES
C/o increased thirst x 2 weeks. States she saw her PCP for same & was told she was dehydrated last week. States she has been drinking coconut water & gatorade without any relief of symptoms. States she feels generally weak & tired. Denies h/o diabetes. Denies vomiting or diarrhea.

## 2017-07-11 NOTE — DISCHARGE INSTRUCTIONS
Fatigue: Care Instructions  Your Care Instructions  Fatigue is a feeling of tiredness, exhaustion, or lack of energy. You may feel fatigue because of too much or not enough activity. It can also come from stress, lack of sleep, boredom, and poor diet. Many medical problems, such as viral infections, can cause fatigue. Emotional problems, especially depression, are often the cause of fatigue. Fatigue is most often a symptom of another problem. Treatment for fatigue depends on the cause. For example, if you have fatigue because you have a certain health problem, treating this problem also treats your fatigue. If depression or anxiety is the cause, treatment may help. Follow-up care is a key part of your treatment and safety. Be sure to make and go to all appointments, and call your doctor if you are having problems. It's also a good idea to know your test results and keep a list of the medicines you take. How can you care for yourself at home? · Get regular exercise. But don't overdo it. Go back and forth between rest and exercise. · Get plenty of rest.  · Eat a healthy diet. Do not skip meals, especially breakfast.  · Reduce your use of caffeine, tobacco, and alcohol. Caffeine is most often found in coffee, tea, cola drinks, and chocolate. · Limit medicines that can cause fatigue. This includes tranquilizers and cold and allergy medicines. When should you call for help? Watch closely for changes in your health, and be sure to contact your doctor if:  · You have new symptoms such as fever or a rash. · Your fatigue gets worse. · You have been feeling down, depressed, or hopeless. Or you may have lost interest in things that you usually enjoy. · You are not getting better as expected. Where can you learn more? Go to http://tristan-audra.info/. Enter J195 in the search box to learn more about \"Fatigue: Care Instructions. \"  Current as of: March 20, 2017  Content Version: 11.3  © 5874-3026 Healthwise, Inc[employer]. Care instructions adapted under license by [employer] (which disclaims liability or warranty for this information). If you have questions about a medical condition or this instruction, always ask your healthcare professional. [address] any warranty or liability for your use of this information. [employer] Activation    Thank you for requesting access to [facility]. Please follow the instructions below to securely access and download your online medical record. [employer] allows you to send messages to your doctor, view your test results, renew your prescriptions, schedule appointments, and more. How Do I Sign Up? 1. In your internet browser, go to [email or web address])  2. Click on the First Time User? Click Here link in the Sign In box. You will be redirect to the New Member Sign Up page. 3. Enter your [employer] Access Code exactly as it appears below. You will not need to use this code after youve completed the sign-up process. If you do not sign up before the expiration date, you must request a new code. [employer] Access Code: [email or web address]  Expires: 10/9/2017  5:59 PM (This is the date your [employer] access code will )    4. Enter the last four digits of your Social Security Number (xxxx) and Date of Birth (mm/dd/yyyy) as indicated and click Submit. You will be taken to the next sign-up page. 5. Create a [employer] ID. This will be your [employer] login ID and cannot be changed, so think of one that is secure and easy to remember. 6. Create a [employer] password. You can change your password at any time. 7. Enter your Password Reset Question and Answer. This can be used at a later time if you forget your password. 8. Enter your e-mail address. You will receive e-mail notification when new information is available in [address] 9. Click Sign Up. You can now view and download portions of your medical record.   10. Click the Download Summary menu link to download a portable copy of your medical information. Additional Information    If you have questions, please visit the Frequently Asked Questions section of the [employer] website at [email or web address] Remember, [employer] is NOT to be used for urgent needs. For medical emergencies, dial 911.

## 2017-07-12 LAB
ATRIAL RATE: 92 BPM
CALCULATED P AXIS, ECG09: 67 DEGREES
CALCULATED R AXIS, ECG10: -21 DEGREES
CALCULATED T AXIS, ECG11: 77 DEGREES
DIAGNOSIS, 93000: NORMAL
P-R INTERVAL, ECG05: 164 MS
Q-T INTERVAL, ECG07: 398 MS
QRS DURATION, ECG06: 86 MS
QTC CALCULATION (BEZET), ECG08: 492 MS
VENTRICULAR RATE, ECG03: 92 BPM

## 2018-03-24 ENCOUNTER — APPOINTMENT (OUTPATIENT)
Dept: GENERAL RADIOLOGY | Age: 68
End: 2018-03-24
Attending: EMERGENCY MEDICINE
Payer: MEDICARE

## 2018-03-24 ENCOUNTER — HOSPITAL ENCOUNTER (EMERGENCY)
Age: 68
Discharge: HOME | End: 2018-03-24
Attending: EMERGENCY MEDICINE
Payer: MEDICARE

## 2018-03-24 VITALS
BODY MASS INDEX: 31.89 KG/M2 | HEIGHT: 63 IN | RESPIRATION RATE: 19 BRPM | SYSTOLIC BLOOD PRESSURE: 126 MMHG | DIASTOLIC BLOOD PRESSURE: 77 MMHG | HEART RATE: 100 BPM | TEMPERATURE: 98.3 F | WEIGHT: 180 LBS | OXYGEN SATURATION: 98 %

## 2018-03-24 DIAGNOSIS — J06.9 ACUTE URI: Primary | ICD-10-CM

## 2018-03-24 DIAGNOSIS — E86.0 DEHYDRATION: ICD-10-CM

## 2018-03-24 PROCEDURE — 71046 X-RAY EXAM CHEST 2 VIEWS: CPT

## 2018-03-24 PROCEDURE — 99282 EMERGENCY DEPT VISIT SF MDM: CPT

## 2018-03-24 NOTE — DISCHARGE INSTRUCTIONS
Upper Respiratory Infection (Cold): Care Instructions  Your Care Instructions    An upper respiratory infection, or URI, is an infection of the nose, sinuses, or throat. URIs are spread by coughs, sneezes, and direct contact. The common cold is the most frequent kind of URI. The flu and sinus infections are other kinds of URIs. Almost all URIs are caused by viruses. Antibiotics won't cure them. But you can treat most infections with home care. This may include drinking lots of fluids and taking over-the-counter pain medicine. You will probably feel better in 4 to 10 days. The doctor has checked you carefully, but problems can develop later. If you notice any problems or new symptoms, get medical treatment right away. Follow-up care is a key part of your treatment and safety. Be sure to make and go to all appointments, and call your doctor if you are having problems. It's also a good idea to know your test results and keep a list of the medicines you take. How can you care for yourself at home? · To prevent dehydration, drink plenty of fluids, enough so that your urine is light yellow or clear like water. Choose water and other caffeine-free clear liquids until you feel better. If you have kidney, heart, or liver disease and have to limit fluids, talk with your doctor before you increase the amount of fluids you drink. · Take an over-the-counter pain medicine, such as acetaminophen (Tylenol), ibuprofen (Advil, Motrin), or naproxen (Aleve). Read and follow all instructions on the label. · Before you use cough and cold medicines, check the label. These medicines may not be safe for young children or for people with certain health problems. · Be careful when taking over-the-counter cold or flu medicines and Tylenol at the same time. Many of these medicines have acetaminophen, which is Tylenol. Read the labels to make sure that you are not taking more than the recommended dose.  Too much acetaminophen (Tylenol) can be harmful. · Get plenty of rest.  · Do not smoke or allow others to smoke around you. If you need help quitting, talk to your doctor about stop-smoking programs and medicines. These can increase your chances of quitting for good. When should you call for help? Call 911 anytime you think you may need emergency care. For example, call if:  ? · You have severe trouble breathing. ?Call your doctor now or seek immediate medical care if:  ? · You seem to be getting much sicker. ? · You have new or worse trouble breathing. ? · You have a new or higher fever. ? · You have a new rash. ? Watch closely for changes in your health, and be sure to contact your doctor if:  ? · You have a new symptom, such as a sore throat, an earache, or sinus pain. ? · You cough more deeply or more often, especially if you notice more mucus or a change in the color of your mucus. ? · You do not get better as expected. Where can you learn more? Go to http://tristan-audra.info/. Enter I337 in the search box to learn more about \"Upper Respiratory Infection (Cold): Care Instructions. \"  Current as of: May 12, 2017  Content Version: 11.4  © 7763-6435 light. Care instructions adapted under license by [employer] (which disclaims liability or warranty for this information). If you have questions about a medical condition or this instruction, always ask your healthcare professional. [place] [address] any warranty or liability for your use of this information. Oral Rehydration: Care Instructions  Your Care Instructions    Dehydration occurs when your body loses too much water. This can happen if you do not drink enough fluids or lose a lot of fluid due to diarrhea, vomiting, or sweating. Being dehydrated can cause health problems and can even be life-threatening.   To replace lost fluids, you need to drink liquid that contains special chemicals called electrolytes. Electrolytes keep your body working well. Plain water does not have electrolytes. You also need to rest to prevent more fluid loss. Replacing water and electrolytes (oral rehydration) completely takes about 36 hours. But you should feel better within a few hours. Follow-up care is a key part of your treatment and safety. Be sure to make and go to all appointments, and call your doctor if you are having problems. It's also a good idea to know your test results and keep a list of the medicines you take. How can you care for yourself at home? · Take frequent sips of a drink such as Gatorade, Powerade, or other rehydration drinks that your doctor suggests. These replace both fluid and important chemicals (electrolytes) you need for balance in your blood. · Drink 2 quarts of cool liquid over 2 to 4 hours. You should have at least 10 glasses of liquid a day to replace lost fluid. If you have kidney, heart, or liver disease and have to limit fluids, talk with your doctor before you increase the amount of fluids you drink. · Make your own drink. Measure everything carefully. The drink may not work well or may even be harmful if the amounts are off. ¨ 1 quart water  ¨ ½ teaspoon salt  ¨ 6 teaspoons sugar  · Do not drink liquid with caffeine, such as coffee and brenda. · Do not drink any alcohol. It can make you dehydrated. · Drink plenty of fluids, enough so that your urine is light yellow or clear like water. If you have kidney, heart, or liver disease and have to limit fluids, talk with your doctor before you increase the amount of fluids you drink. When should you call for help? Call 911 anytime you think you may need emergency care. For example, call if:  ? · You have signs of severe dehydration, such as:  ¨ You are confused or unable to stay awake. ¨ You passed out (lost consciousness). ?Call your doctor now or seek immediate medical care if:  ? · You still have signs of dehydration.  You have sunken eyes and a dry mouth, and you pass only a little dark urine. ? · You are dizzy or lightheaded, or you feel like you may faint. ? · You are not able to keep down fluids. ? Watch closely for changes in your health, and be sure to contact your doctor if:  ? · You do not get better as expected. Where can you learn more? Go to http://tristan-audra.info/. Enter I040 in the search box to learn more about \"Oral Rehydration: Care Instructions. \"  Current as of: March 20, 2017  Content Version: 11.4  © 6519-5899 [employer]. Care instructions adapted under license by [employer] (which disclaims liability or warranty for this information). If you have questions about a medical condition or this instruction, always ask your healthcare professional. [address] any warranty or liability for your use of this information.

## 2018-03-24 NOTE — ED PROVIDER NOTES
EMERGENCY DEPARTMENT HISTORY AND PHYSICAL EXAM    11:49 AM      Date: 3/24/2018  Patient Name: Morena Correa    History of Presenting Illness     Chief Complaint   Patient presents with    Fatigue    Sinus Pain    Cough         History Provided By: Patient    Chief Complaint: Fatigue  Duration:  Weeks  Timing:  Acute  Location: Sinus, Chest  Quality: Congested  Severity: Moderate  Modifying Factors:   Associated Symptoms: Intermittent fever, SOB, Congestion, Sinus Pain      Additional History (Context): Morena Correa is a 76 y.o. female with history of HTN,CHF and Hypothyroidism who presents to the ED c/o fatigue. Pt reports that she has been sick for the past 2 weeks. She was seen by her PCP 5 days ago and tx w/ Z-Pack. Pt states that her sx improved in 2 days, but have worsened since wednesday. Pt notes associated fever, SOB, congestion, sinus pain. Pt denies nausea, vomiting, diarrhea, or any other acute sx at this time. PCP: Merary Deluna MD    Current Outpatient Prescriptions   Medication Sig Dispense Refill    FLUoxetine (PROZAC) 20 mg capsule Take 20 mg by mouth daily.  lisinopril (PRINIVIL, ZESTRIL) 5 mg tablet Take 5 mg by mouth daily.  diazePAM (VALIUM) 5 mg tablet Take 5 mg by mouth every six (6) hours as needed for Anxiety.  temazepam (RESTORIL) 30 mg capsule Take 30 mg by mouth nightly as needed for Sleep.  folic acid (FOLVITE) 1 mg tablet Take  by mouth daily.  lamoTRIgine (LAMICTAL) 100 mg tablet Take 100 mg by mouth daily.  omeprazole (PRILOSEC) 40 mg capsule Take 40 mg by mouth daily.  buPROPion SR (WELLBUTRIN SR) 150 mg SR tablet Take  by mouth two (2) times a day.            Past History     Past Medical History:  Past Medical History:   Diagnosis Date    CC (collagenous colitis)     Chronic combined systolic and diastolic heart failure ([facility] [address] 7/18/2013    sob on exertion,class3     Endometriosis     Essential hypertension, benign     The hypertension is stable. UNABLE TO TOLERATE BETA BLOCKER, PT BACK ON LISINOPRIL 10 MG A DAY.  Essential hypertension, benign     The hypertension is stable. UNABLE TO TOLERATE BETA BLOCKER, PT BACK ON LISINOPRIL 10 MG A DAY.  Hypertension     Hypothyroid     Mitral valve insufficiency and aortic valve insufficiency     Mild AI/mild MR, no symptoms    Mitral valve insufficiency and aortic valve insufficiency     Mild AI/mild MR, no symptoms     Neuropathy     Other primary cardiomyopathies 2013    ef decreased again to 35-40% unclear non ischemic etiology     Other specified cardiac dysrhythmias(427.89)     OCC PVC, one 4 beat SVT    Palpitations     10/12 recurrent post ablation of SVT in ; EKG with S Tach in 130s today; obtain results of recent labs from PCP, obtain results if recent Holter from Dr. Trista Rice Paroxysmal supraventricular tachycardia ([address]      ablation    Paroxysmal supraventricular tachycardia ([address]      ablation        Past Surgical History:  Past Surgical History:   Procedure Laterality Date    CARDIAC SURG PROCEDURE UNLIST      ablation;  for SVT - slow pathway; Dr. Ajay Anna    HX BREAST REDUCTION      HX CARPAL TUNNEL RELEASE      HX  SECTION      x 2     HX CHOLECYSTECTOMY      HX DILATION AND CURETTAGE      HX HERNIA REPAIR      Incisional herniorrhaphy    HX ORTHOPAEDIC      right ankle and left foot    HX TONSILLECTOMY         Family History:  Family History   Problem Relation Age of Onset    Heart Attack Mother 52    Heart Attack Father 61    Heart Attack Other     Hypertension Other     Stroke Neg Hx     Sudden Death Neg Hx        Social History:  Social History   Substance Use Topics    Smoking status: Never Smoker    Smokeless tobacco: Never Used    Alcohol use No       Allergies:   Allergies   Allergen Reactions    Latex Rash     PT DENIES    Neuromuscular Blockers, Steroidal Other (comments)     Hallucinations     Adhesive Rash    Augmentin [Amoxicillin-Pot Clavulanate] Diarrhea, Rash and Nausea and Vomiting    Coreg [Carvedilol] Diarrhea    Digoxin Diarrhea    Metoprolol Nausea and Vomiting    Toprol Xl [Metoprolol Succinate] Diarrhea     virtigo         Review of Systems     Review of Systems   Constitutional: Positive for fatigue and fever. HENT: Positive for congestion and sinus pain. Respiratory: Positive for cough and shortness of breath. Cardiovascular: Negative for chest pain and leg swelling. Gastrointestinal: Negative for abdominal pain, nausea and vomiting. Genitourinary: Negative for dysuria. Musculoskeletal: Negative. Neurological: Negative for speech difficulty and headaches. All other systems reviewed and are negative. Physical Exam     Visit Vitals    /77 (BP 1 Location: Left arm, BP Patient Position: At rest)    Pulse 100    Temp 98.3 °F (36.8 °C)    Resp 19    Ht 5' 3\" (1.6 m)    Wt 81.6 kg (180 lb)    SpO2 98%    BMI 31.89 kg/m2     Physical Exam   Constitutional:     General:  Well-developed, well-nourished, well-appearing nonlabored respirations nontoxic nondiaphoretic. Head:  Normocephalic atraumatic. Eyes:  Pupils midrange extraocular movements intact. No pallor or conjunctival injection. Nose:  No rhinorrhea, inspection grossly normal.    Ears:  Grossly normal to inspection, no discharge. Mouth:  Mucous membranes moist, no appreciable intraoral lesion. No erythema no exudate  Neck/Back:  Trachea midline, no asymmetry. Chest:  Grossly normal inspection, symmetric chest rise. Pulmonary:  Clear to auscultation bilaterally no wheezes rhonchi or rales. No wheezing with forced cough  Cardiovascular:  S1-S2 no murmurs rubs or gallops. Abdomen: Soft, nontender, nondistended no guarding rebound or peritoneal signs.   No CVA tenderness  Extremities:  Grossly normal to inspection, peripheral pulses intact    Neurologic:  Alert and oriented no appreciable focal neurologic deficit. Skin:  Warm and dry  Psychiatric:  Grossly normal mood and affect. Nursing note reviewed, vital signs reviewed. Diagnostic Study Results     Labs -  No results found for this or any previous visit (from the past 12 hour(s)). Radiologic Studies -   XR CHEST PA LAT   Final Result            Medical Decision Making   I am the first provider for this patient. I reviewed the vital signs, available nursing notes, past medical history, past surgical history, family history and social history. Vital Signs-Reviewed the patient's vital signs. Pulse Oximetry Analysis -  98% on room air (Non-Hypoxic)    Cardiac Monitor:  Rate: 100  Rhythm:  Normal Sinus Rhythm     Records Reviewed: Nursing Notes (Time of Review: 11:49 AM)    ED Course: Progress Notes, Reevaluation, and Consults:      Provider Notes (Medical Decision Making):       ED course:  Patient with what sounds like a viral syndrome that had resolved on Wednesday and now have recurred presents with primary nose cough and sore throat. She is well-appearing afebrile and not tachycardic saturation is 98% on room air will which is normal.    Most likely is a history of recurrent URI, doubtful ACS CHF PE. He declined labwork here, plan for chest x-ray which was read by radiology and normal.  She had oral hydration here tolerated by mouth intake well and felt better. She requested to be discharged will follow-up with primary care physician return with any concerns    Patient's presentation, history, physical exam and laboratory evaluations were reviewed. At this time patient was felt to be stable for outpatient management and follow with primary care/specialist.  Patient was instructed to return to the emergency department with any concerns. Disposition:    Discharged home      Portions of this chart were created with Dragon medical speech to text program.   Unrecognized errors may be present.     Disposition: DISCHARGE    Follow-up Information     Follow up With Details Comments Contact Info    your own primary care physican Call in 2 days      [facility] EMERGENCY DEPT  As needed, If symptoms worsen [id number] [facility]  [phone]           Discharge Medication List as of 3/24/2018  1:00 PM      CONTINUE these medications which have NOT CHANGED    Details   FLUoxetine (PROZAC) 20 mg capsule Take 20 mg by mouth daily. , Historical Med      lisinopril (PRINIVIL, ZESTRIL) 5 mg tablet Take 5 mg by mouth daily. , Historical Med      diazePAM (VALIUM) 5 mg tablet Take 5 mg by mouth every six (6) hours as needed for Anxiety. , Historical Med      temazepam (RESTORIL) 30 mg capsule Take 30 mg by mouth nightly as needed for Sleep., Historical Med      folic acid (FOLVITE) 1 mg tablet Take  by mouth daily. , Historical Med      lamoTRIgine (LAMICTAL) 100 mg tablet Take 100 mg by mouth daily. , Historical Med      omeprazole (PRILOSEC) 40 mg capsule Take 40 mg by mouth daily. , Historical Med      buPROPion SR (WELLBUTRIN SR) 150 mg SR tablet Take  by mouth two (2) times a day.  , Historical Med           _______________________________    Attestations:  Scribe Attestation     Lizzy Nicole acting as a scribe for and in the presence of Adal Jaime MD      March 24, 2018 at 11:55 AM       Provider Attestation:      I personally performed the services described in the documentation, reviewed the documentation, as recorded by the scribe in my presence, and it accurately and completely records my words and actions.  March 24, 2018 at 11:55 AM - Adal Jaime MD    _______________________________

## 2018-03-24 NOTE — ED TRIAGE NOTES
Sinus congestion/pain, dry cough, fatigue, nausea and diarrhea x 2 weeks. Was seen on Monday and prescribed Z pack for URI.

## 2019-01-07 ENCOUNTER — HOSPITAL ENCOUNTER (OUTPATIENT)
Dept: GENERAL RADIOLOGY | Age: 69
Discharge: HOME | End: 2019-01-07
Payer: MEDICARE

## 2019-01-07 DIAGNOSIS — M35.00 SICCA SYNDROME (HCC): ICD-10-CM

## 2019-01-07 PROCEDURE — 71046 X-RAY EXAM CHEST 2 VIEWS: CPT

## 2019-02-25 ENCOUNTER — HOSPITAL ENCOUNTER (OUTPATIENT)
Dept: LAB | Age: 69
Discharge: HOME | End: 2019-02-25
Payer: MEDICARE

## 2019-02-25 DIAGNOSIS — I63.031 CEREBRAL INFARCTION DUE TO THROMBOSIS OF RIGHT CAROTID ARTERY (HCC): ICD-10-CM

## 2019-02-25 DIAGNOSIS — G50.0 TRIGEMINAL NEURALGIA: ICD-10-CM

## 2019-02-25 DIAGNOSIS — G25.0 ESSENTIAL TREMOR: ICD-10-CM

## 2019-02-25 LAB
ERYTHROCYTE [SEDIMENTATION RATE] IN BLOOD: 14 MM/HR (ref 0–30)
FOLATE SERPL-MCNC: >20 NG/ML (ref 3.1–17.5)
TSH SERPL DL<=0.05 MIU/L-ACNC: 1.13 UIU/ML (ref 0.36–3.74)
VIT B12 SERPL-MCNC: 381 PG/ML (ref 211–911)

## 2019-02-25 PROCEDURE — 84443 ASSAY THYROID STIM HORMONE: CPT

## 2019-02-25 PROCEDURE — 85652 RBC SED RATE AUTOMATED: CPT

## 2019-02-25 PROCEDURE — 86780 TREPONEMA PALLIDUM: CPT

## 2019-02-25 PROCEDURE — 86038 ANTINUCLEAR ANTIBODIES: CPT

## 2019-02-25 PROCEDURE — 82607 VITAMIN B-12: CPT

## 2019-02-25 PROCEDURE — 36415 COLL VENOUS BLD VENIPUNCTURE: CPT

## 2019-02-26 LAB
ANA SER QL: POSITIVE
T PALLIDUM AB SER QL IF: NON REACTIVE

## 2019-06-16 ENCOUNTER — APPOINTMENT (OUTPATIENT)
Dept: GENERAL RADIOLOGY | Age: 69
End: 2019-06-16
Attending: EMERGENCY MEDICINE
Payer: MEDICARE

## 2019-06-16 ENCOUNTER — HOSPITAL ENCOUNTER (EMERGENCY)
Age: 69
Discharge: HOME | End: 2019-06-16
Attending: EMERGENCY MEDICINE | Admitting: EMERGENCY MEDICINE
Payer: MEDICARE

## 2019-06-16 VITALS
SYSTOLIC BLOOD PRESSURE: 124 MMHG | HEART RATE: 88 BPM | TEMPERATURE: 98 F | OXYGEN SATURATION: 99 % | WEIGHT: 165 LBS | HEIGHT: 63 IN | BODY MASS INDEX: 29.23 KG/M2 | DIASTOLIC BLOOD PRESSURE: 69 MMHG | RESPIRATION RATE: 20 BRPM
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
## 2019-06-16 DIAGNOSIS — S89.91XA INJURY OF RIGHT KNEE, INITIAL ENCOUNTER: Primary | ICD-10-CM

## 2019-06-16 DIAGNOSIS — S16.1XXA NECK STRAIN, INITIAL ENCOUNTER: ICD-10-CM

## 2019-06-16 PROCEDURE — 72050 X-RAY EXAM NECK SPINE 4/5VWS: CPT

## 2019-06-16 PROCEDURE — 73564 X-RAY EXAM KNEE 4 OR MORE: CPT

## 2019-06-16 PROCEDURE — 99283 EMERGENCY DEPT VISIT LOW MDM: CPT

## 2019-06-16 PROCEDURE — L1830 KO IMMOB CANVAS LONG PRE OTS: HCPCS

## 2019-06-16 RX ORDER — METAXALONE 800 MG/1
800 TABLET ORAL 4 TIMES DAILY
Qty: 20 TAB | Refills: 0 | Status: SHIPPED | OUTPATIENT
Start: 2019-06-16 | End: 2019-06-21

## 2019-06-16 RX ORDER — IBUPROFEN 600 MG/1
600 TABLET ORAL EVERY 8 HOURS
Qty: 15 TAB | Refills: 0 | Status: SHIPPED | OUTPATIENT
Start: 2019-06-16

## 2019-06-16 RX ORDER — LIDOCAINE HCL 4 G/100G
CREAM TOPICAL
Qty: 1 TUBE | Refills: 0 | Status: SHIPPED | OUTPATIENT
Start: 2019-06-16

## 2019-06-16 NOTE — ED TRIAGE NOTES
Pt states was at a wedding last night and was getting off the party bus and someone had moved a step. States her right knee locked up nut she didn't fall all the way to the ground. Also c/o neck pain.  Pt arrives in triage wearing sunglasses

## 2019-06-16 NOTE — ED NOTES
Zuhair Anaya is a 71 y.o. female that was discharged in stable. Pt was accompanied by friend. Pt is driving. The patients diagnosis, condition and treatment were explained to  patient and aftercare instructions were given. The patient verbalized understanding. Patient armband removed and shredded.

## 2019-06-16 NOTE — DISCHARGE INSTRUCTIONS
Patient Education        Neck Strain: Care Instructions  Your Care Instructions    You have strained the muscles and ligaments in your neck. A sudden, awkward movement can strain the neck. This often occurs with falls or car accidents or during certain sports. Everyday activities like working on a computer or sleeping can also cause neck strain if they force you to hold your neck in an awkward position for a long time. It is common for neck pain to get worse for a day or two after an injury, but it should start to feel better after that. You may have more pain and stiffness for several days before it gets better. This is expected. It may take a few weeks or longer for it to heal completely. Good home treatment can help you get better faster and avoid future neck problems. Follow-up care is a key part of your treatment and safety. Be sure to make and go to all appointments, and call your doctor if you are having problems. It's also a good idea to know your test results and keep a list of the medicines you take. How can you care for yourself at home? · If you were given a neck brace (cervical collar) to limit neck motion, wear it as instructed for as many days as your doctor tells you to. Do not wear it longer than you were told to. Wearing a brace for too long can make neck stiffness worse and weaken the neck muscles. · You can try using heat or ice to see if it helps. ? Try using a heating pad on a low or medium setting for 15 to 20 minutes every 2 to 3 hours. Try a warm shower in place of one session with the heating pad. You can also buy single-use heat wraps that last up to 8 hours. ? You can also try an ice pack for 10 to 15 minutes every 2 to 3 hours. · Take pain medicines exactly as directed. ? If the doctor gave you a prescription medicine for pain, take it as prescribed. ? If you are not taking a prescription pain medicine, ask your doctor if you can take an over-the-counter medicine.   · Gently rub the area to relieve pain and help with blood flow. Do not massage the area if it hurts to do so. · Do not do anything that makes the pain worse. Take it easy for a couple of days. You can do your usual activities if they do not hurt your neck or put it at risk for more stress or injury. · Try sleeping on a special neck pillow. Place it under your neck, not under your head. Placing a tightly rolled-up towel under your neck while you sleep will also work. If you use a neck pillow or rolled towel, do not use your regular pillow at the same time. · To prevent future neck pain, do exercises to stretch and strengthen your neck and back. Learn how to use good posture, safe lifting techniques, and proper body mechanics. When should you call for help? Call 911 anytime you think you may need emergency care. For example, call if:    · You are unable to move an arm or a leg at all.   [facility] your doctor now or seek immediate medical care if:    · You have new or worse symptoms in your arms, legs, chest, belly, or buttocks. Symptoms may include:  ? Numbness or tingling. ? Weakness. ? Pain.     · You lose bladder or bowel control.    Watch closely for changes in your health, and be sure to contact your doctor if:    · You are not getting better as expected. Where can you learn more? Go to http://tristan-audra.info/. Enter M253 in the search box to learn more about \"Neck Strain: Care Instructions. \"  Current as of: September 20, 2018  Content Version: 11.9  © 1510-6298 [employer]. Care instructions adapted under license by [employer] (which disclaims liability or warranty for this information). If you have questions about a medical condition or this instruction, always ask your healthcare professional. [place] [address] any warranty or liability for your use of this information.          Patient Education        Preventing Falls: Care Instructions  Your Care Instructions    Getting around your home safely can be a challenge if you have injuries or health problems that make it easy for you to fall. Loose rugs and furniture in walkways are among the dangers for many older people who have problems walking or who have poor eyesight. People who have conditions such as arthritis, osteoporosis, or dementia also have to be careful not to fall. You can make your home safer with a few simple measures. Follow-up care is a key part of your treatment and safety. Be sure to make and go to all appointments, and call your doctor if you are having problems. It's also a good idea to know your test results and keep a list of the medicines you take. How can you care for yourself at home? Taking care of yourself  · You may get dizzy if you do not drink enough water. To prevent dehydration, drink plenty of fluids, enough so that your urine is light yellow or clear like water. Choose water and other caffeine-free clear liquids. If you have kidney, heart, or liver disease and have to limit fluids, talk with your doctor before you increase the amount of fluids you drink. · Exercise regularly to improve your strength, muscle tone, and balance. Walk if you can. Swimming may be a good choice if you cannot walk easily. · Have your vision and hearing checked each year or any time you notice a change. If you have trouble seeing and hearing, you might not be able to avoid objects and could lose your balance. · Know the side effects of the medicines you take. Ask your doctor or pharmacist whether the medicines you take can affect your balance. Sleeping pills or sedatives can affect your balance. · Limit the amount of alcohol you drink. Alcohol can impair your balance and other senses. · Ask your doctor whether calluses or corns on your feet need to be removed. If you wear loose-fitting shoes because of calluses or corns, you can lose your balance and fall.   · Talk to your doctor if you have numbness in your feet. Preventing falls at home  · Remove raised doorway thresholds, throw rugs, and clutter. Repair loose carpet or raised areas in the floor. · Move furniture and electrical cords to keep them out of walking paths. · Use nonskid floor wax, and wipe up spills right away, especially on ceramic tile floors. · If you use a walker or cane, put rubber tips on it. If you use crutches, clean the bottoms of them regularly with an abrasive pad, such as steel wool. · Keep your house well lit, especially [place]y [place], and outside walkways. Use night-lights in areas such as hallways and bathrooms. Add extra light switches or use remote switches (such as switches that go on or off when you clap your hands) to make it easier to turn lights on if you have to get up during the night. · Install sturdy handrails on stairways. · Move items in your cabinets so that the things you use a lot are on the lower shelves (about waist level). · Keep a cordless phone and a flashlight with new batteries by your bed. If possible, put a phone in each of the main rooms of your house, or carry a cell phone in case you fall and cannot reach a phone. Or, you can wear a device around your neck or wrist. You push a button that sends a signal for help. · Wear low-heeled shoes that fit well and give your feet good support. Use footwear with nonskid soles. Check the heels and soles of your shoes for wear. Repair or replace worn heels or soles. · Do not wear socks without shoes on wood floors. · Walk on the grass when the sidewalks are slippery. If you live in an area that gets snow and ice in the winter, sprinkle salt on slippery steps and sidewalks. Preventing falls in the bath  · Install grab bars and nonskid mats inside and outside your shower or tub and near the toilet and sinks. · Use shower chairs and bath benches. · Use a hand-held shower head that will allow you to sit while showering.   · Get into a tub or shower by putting the weaker leg in first. Get out of a tub or shower with your strong side first.  · Repair loose toilet seats and consider installing a raised toilet seat to make getting on and off the toilet easier. · Keep your bathroom door unlocked while you are in the shower. Where can you learn more? Go to http://tristan-audra.info/. Enter 0476 79 69 [id number] in the search box to learn more about \"Preventing Falls: Care Instructions. \"  Current as of: March 15, 2018  Content Version: 11.9  © 1829-4377 [employer]. Care instructions adapted under license by [employer] (which disclaims liability or warranty for this information). If you have questions about a medical condition or this instruction, always ask your healthcare professional. [address] any warranty or liability for your use of this information. Patient Education        Neck Strain or Sprain: Rehab Exercises  Your Care Instructions  Here are some examples of typical rehabilitation exercises for your condition. Start each exercise slowly. Ease off the exercise if you start to have pain. Your doctor or physical therapist will tell you when you can start these exercises and which ones will work best for you. How to do the exercises  Neck rotation    1. Sit in a firm chair, or stand up straight. 2. Keeping your chin level, turn your head to the right, and hold for 15 to 30 seconds. 3. Turn your head to the left and hold for 15 to 30 seconds. 4. Repeat 2 to 4 times to each side. Neck stretches    1. Look straight ahead, and tip your right ear to your right shoulder. Do not let your left shoulder rise up as you tip your head to the right. 2. Hold for 15 to 30 seconds. 3. Tilt your head to the left. Do not let your right shoulder rise up as you tip your head to the left. 4. Hold for 15 to 30 seconds. 5. Repeat 2 to 4 times to each side. Forward neck flexion    1.  Sit in a firm chair, or stand up straight. 2. Bend your head forward. 3. Hold for 15 to 30 seconds. 4. Repeat 2 to 4 times. Lateral (side) bend strengthening    1. With your right hand, place your first two fingers on your right temple. 2. Start to bend your head to the side while using gentle pressure from your fingers to keep your head from bending. 3. Hold for about 6 seconds. 4. Repeat 8 to 12 times. 5. Switch hands and repeat the same exercise on your left side. Forward bend strengthening    1. Place your first two fingers of either hand on your forehead. 2. Start to bend your head forward while using gentle pressure from your fingers to keep your head from bending. 3. Hold for about 6 seconds. 4. Repeat 8 to 12 times. Neutral position strengthening    1. Using one hand, place your fingertips on the back of your head at the top of your neck. 2. Start to bend your head backward while using gentle pressure from your fingers to keep your head from bending. 3. Hold for about 6 seconds. 4. Repeat 8 to 12 times. Chin tuck    1. Lie on the floor with a rolled-up towel under your neck. Your head should be touching the floor. 2. Slowly bring your chin toward your chest.  3. Hold for a count of 6, and then relax for up to 10 seconds. 4. Repeat 8 to 12 times. Follow-up care is a key part of your treatment and safety. Be sure to make and go to all appointments, and call your doctor if you are having problems. It's also a good idea to know your test results and keep a list of the medicines you take. Where can you learn more? Go to http://tristan-audra.info/. Enter M679 in the search box to learn more about \"Neck Strain or Sprain: Rehab Exercises. \"  Current as of: September 20, 2018  Content Version: 11.9  © 9622-2119 [employer]. Care instructions adapted under license by [employer] (which disclaims liability or warranty for this information).  If you have questions about a medical condition or this instruction, always ask your healthcare professional. [place] [address] any warranty or liability for your use of this information.

## 2019-06-16 NOTE — ED PROVIDER NOTES
EMERGENCY DEPARTMENT HISTORY AND PHYSICAL EXAM    Date: 6/16/2019  Patient Name: Elisabeth Tripathi    History of Presenting Illness     Chief Complaint   Patient presents with    Knee Injury    Neck Pain         History Provided By: Patient        Additional History (Context): Elisabeth Tripathi is a 71 y.o. female with osteoarthritis and See below who presents with right knee pain since last night. Patient was at a wedding and she felt like her knee buckled and she fell forward hyperflexing her neck in a reaction as she went forward. Denies falling or hitting the ground. Has a very large effusion to her right knee with pain upon ambulation. Denies numbness or weakness in her upper extremities. Has had knee problems with this affected limb and used to see Dr. Brinda Rice when he was with [facility] orth[facility]. PCP: Merary Deluna MD    Current Outpatient Medications   Medication Sig Dispense Refill    lidocaine (XYLOCAINE) 4 % topical cream Apply  to affected area three (3) times daily as needed for Pain. 1 Tube 0    ibuprofen (MOTRIN) 600 mg tablet Take 1 Tab by mouth every eight (8) hours. 15 Tab 0    metaxalone (SKELAXIN) 800 mg tablet Take 1 Tab by mouth four (4) times daily for 5 days. 20 Tab 0    FLUoxetine (PROZAC) 20 mg capsule Take 20 mg by mouth daily.  lisinopril (PRINIVIL, ZESTRIL) 5 mg tablet Take 5 mg by mouth daily.  diazePAM (VALIUM) 5 mg tablet Take 5 mg by mouth every six (6) hours as needed for Anxiety.  temazepam (RESTORIL) 30 mg capsule Take 30 mg by mouth nightly as needed for Sleep.  folic acid (FOLVITE) 1 mg tablet Take  by mouth daily.  lamoTRIgine (LAMICTAL) 100 mg tablet Take 100 mg by mouth daily.  omeprazole (PRILOSEC) 40 mg capsule Take 40 mg by mouth daily.  buPROPion SR (WELLBUTRIN SR) 150 mg SR tablet Take  by mouth two (2) times a day.            Past History     Past Medical History:  Past Medical History:   Diagnosis Date    CC (collagenous colitis)     Chronic combined systolic and diastolic heart failure ([facility] [address] 2013    sob on exertion,class3     Endometriosis     Essential hypertension, benign     The hypertension is stable. UNABLE TO TOLERATE BETA BLOCKER, PT BACK ON LISINOPRIL 10 MG A DAY.  Essential hypertension, benign     The hypertension is stable. UNABLE TO TOLERATE BETA BLOCKER, PT BACK ON LISINOPRIL 10 MG A DAY.      Hypertension     Hypothyroid     Mitral valve insufficiency and aortic valve insufficiency     Mild AI/mild MR, no symptoms    Mitral valve insufficiency and aortic valve insufficiency     Mild AI/mild MR, no symptoms     Neuropathy     Other primary cardiomyopathies 2013    ef decreased again to 35-40% unclear non ischemic etiology     Other specified cardiac dysrhythmias(427.89)     OCC PVC, one 4 beat SVT    Palpitations     10/12 recurrent post ablation of SVT in ; EKG with S Tach in 130s today; obtain results of recent labs from PCP, obtain results if recent Holter from Dr. Rachel Panda Paroxysmal supraventricular tachycardia ([facility] [address]      ablation    Paroxysmal supraventricular tachycardia ([facility] [address]      ablation     Psychiatric disorder        Past Surgical History:  Past Surgical History:   Procedure Laterality Date    CARDIAC SURG PROCEDURE UNLIST      ablation;  for SVT - slow pathway; Dr. Susanne Hunt    HX AORTIC VALVE REPLACEMENT      HX BREAST REDUCTION      HX CARPAL TUNNEL RELEASE      HX  SECTION      x 2     HX CHOLECYSTECTOMY      HX DILATION AND CURETTAGE      HX HERNIA REPAIR      Incisional herniorrhaphy    HX ORTHOPAEDIC      right ankle and left foot    HX TONSILLECTOMY         Family History:  Family History   Problem Relation Age of Onset    Heart Attack Mother 52    Heart Attack Father 61    Heart Attack Other     Hypertension Other     Stroke Neg Hx     Sudden Death Neg Hx        Social History:  Social History     Tobacco Use    Smoking status: Never Smoker    Smokeless tobacco: Never Used   Substance Use Topics    Alcohol use: No    Drug use: No       Allergies: Allergies   Allergen Reactions    Latex Rash     PT DENIES    Neuromuscular Blockers, Steroidal Other (comments)     Hallucinations     Adhesive Rash    Augmentin [Amoxicillin-Pot Clavulanate] Diarrhea, Rash and Nausea and Vomiting    Coreg [Carvedilol] Diarrhea    Digoxin Diarrhea    Metoprolol Nausea and Vomiting    Toprol Xl [Metoprolol Succinate] Diarrhea     virtigo         Review of Systems   Review of Systems   Musculoskeletal: Positive for arthralgias, gait problem, joint swelling and neck pain. Skin: Negative for wound. Neurological: Negative for weakness and numbness. All Other Systems Negative  Physical Exam     Vitals:    06/16/19 1225   BP: 124/69   Pulse: 88   Resp: 20   Temp: 98 °F (36.7 °C)   SpO2: 99%   Weight: 74.8 kg (165 lb)   Height: 5' 3\" (1.6 m)     Physical Exam   Constitutional: She is oriented to person, place, and time. She appears well-developed. HENT:   Head: Normocephalic and atraumatic. Eyes: Pupils are equal, round, and reactive to light. Neck: No JVD present. No tracheal deviation present. No thyromegaly present. Midline inferior c-spine TTP. Cardiovascular: Normal rate and regular rhythm. Exam reveals no gallop and no friction rub. Murmur heard. Pulmonary/Chest: Effort normal and breath sounds normal. No stridor. No respiratory distress. She has no wheezes. She has no rales. She exhibits no tenderness. Abdominal: Soft. She exhibits no distension and no mass. There is no tenderness. There is no rebound and no guarding. Musculoskeletal: She exhibits edema and tenderness. Right knee: + effusion. Extension incomplete +2. Flexion 90 degrees with pain. Positive Rosa Isela's and medial joint line tenderness to palpation. Patient is guarding and Lockman's is equivocal.  No varus or valgus stressors.    Lymphadenopathy:     She has no cervical adenopathy. Neurological: She is alert and oriented to person, place, and time. Skin: Skin is warm and dry. No rash noted. No erythema. No pallor. Psychiatric: She has a normal mood and affect. Her behavior is normal. Thought content normal.   Nursing note and vitals reviewed. Diagnostic Study Results     Labs -   No results found for this or any previous visit (from the past 12 hour(s)). Radiologic Studies -   XR KNEE RT MIN 4 V   Final Result   IMPRESSION: No acute displaced fracture. XR SPINE CERV 4 OR 5 V   Final Result   IMPRESSION: Multilevel degenerative changes without definite acute displaced   fracture. CT Results  (Last 48 hours)    None        CXR Results  (Last 48 hours)    None            Medical Decision Making   I am the first provider for this patient. I reviewed the vital signs, available nursing notes, past medical history, past surgical history, family history and social history. Vital Signs-Reviewed the patient's vital signs. Records Reviewed: Nursing Notes    Procedures:  Procedures    Provider Notes (Medical Decision Making): No definite fracture on x-ray of knee and neck appears without anything acute either. Because of the large effusion, will immobilize with concern for internal derangement. Refer to Ortho. Immobilizer placed by tech to right knee; excellent position. N/v intact before and after application. MED RECONCILIATION:  No current facility-administered medications for this encounter. Current Outpatient Medications   Medication Sig    lidocaine (XYLOCAINE) 4 % topical cream Apply  to affected area three (3) times daily as needed for Pain.  ibuprofen (MOTRIN) 600 mg tablet Take 1 Tab by mouth every eight (8) hours.  metaxalone (SKELAXIN) 800 mg tablet Take 1 Tab by mouth four (4) times daily for 5 days.  FLUoxetine (PROZAC) 20 mg capsule Take 20 mg by mouth daily.     lisinopril (PRINIVIL, ZESTRIL) 5 mg tablet Take 5 mg by mouth daily.  diazePAM (VALIUM) 5 mg tablet Take 5 mg by mouth every six (6) hours as needed for Anxiety.  temazepam (RESTORIL) 30 mg capsule Take 30 mg by mouth nightly as needed for Sleep.  folic acid (FOLVITE) 1 mg tablet Take  by mouth daily.  lamoTRIgine (LAMICTAL) 100 mg tablet Take 100 mg by mouth daily.  omeprazole (PRILOSEC) 40 mg capsule Take 40 mg by mouth daily.  buPROPion SR (WELLBUTRIN SR) 150 mg SR tablet Take  by mouth two (2) times a day. Disposition:  home    DISCHARGE NOTE:   1:33 PM    Pt has been reexamined. Patient has no new complaints, changes, or physical findings. Care plan outlined and precautions discussed. Results of x-rays were reviewed with the patient. All medications were reviewed with the patient; will d/c home with skelaxin, lidocaine, ibuprofen,. All of pt's questions and concerns were addressed. Patient was instructed and agrees to follow up with ortho, as well as to return to the ED upon further deterioration. Patient is ready to go home. Follow-up Information     Follow up With Specialties Details Why Contact Info    Adela Salinas MD Orthopedic Surgery Schedule an appointment as soon as possible for a visit in 1 day  [id number] [place] [address] [address] [facility] [address]. [id number] [facility] EMERGENCY DEPT Emergency Medicine  If symptoms worsen return immediately [address] [facility]  [phone]          Current Discharge Medication List      START taking these medications    Details   lidocaine (XYLOCAINE) 4 % topical cream Apply  to affected area three (3) times daily as needed for Pain. Qty: 1 Tube, Refills: 0      ibuprofen (MOTRIN) 600 mg tablet Take 1 Tab by mouth every eight (8) hours. Qty: 15 Tab, Refills: 0      metaxalone (SKELAXIN) 800 mg tablet Take 1 Tab by mouth four (4) times daily for 5 days.   Qty: 20 Tab, Refills: 0             Diagnosis     Clinical Impression:   1. Injury of right knee, initial encounter    2. Neck strain, initial encounter    3.  Fall, initial encounter

## 2019-06-18 ENCOUNTER — OFFICE VISIT (OUTPATIENT)
Dept: ORTHOPEDIC SURGERY | Facility: CLINIC | Age: 69
End: 2019-06-18

## 2019-06-18 VITALS
HEIGHT: 63 IN | WEIGHT: 165 LBS | BODY MASS INDEX: 29.23 KG/M2 | OXYGEN SATURATION: 100 % | TEMPERATURE: 97.3 F | RESPIRATION RATE: 16 BRPM | DIASTOLIC BLOOD PRESSURE: 79 MMHG | SYSTOLIC BLOOD PRESSURE: 141 MMHG | HEART RATE: 86 BPM

## 2019-06-18 DIAGNOSIS — M25.561 ACUTE PAIN OF RIGHT KNEE: Primary | ICD-10-CM

## 2019-06-18 DIAGNOSIS — M17.11 PRIMARY OSTEOARTHRITIS OF ONE KNEE, RIGHT: ICD-10-CM

## 2019-06-18 NOTE — PROGRESS NOTES
Patient: Cheikh Coto                MRN: [id number]       SSN: [id number]  YOB: 1950        AGE: 71 y.o. SEX: female  Body mass index is 29.23 kg/m². PCP: Merary Deluna MD  06/18/19    Chief Complaint: Right knee injury    HISTORY OF PRESENT ILLNESS:  Ziyad Harrington is a 71year-old female who presents to the office today with right knee pain. She had an acute exacerbation of underlying knee pain over the weekend. She missed a step while stepping out of a bus and prior to falling to the ground she was caught, but she did bend her knee. She has been having increasing pain. She was seen in the ER over the weekend where x-rays were taken. She is a previous patient of Dr. Vick Yuan with known arthritis in the knee. Dr. Robby Meza has discussed total knee replacement with her. The pain is getting better. She has been wearing an immobilizer and using her crutch. Past Medical History:   Diagnosis Date    CC (collagenous colitis)     Chronic combined systolic and diastolic heart failure ([address] 7/18/2013    sob on exertion,class3     Endometriosis     Essential hypertension, benign     The hypertension is stable. UNABLE TO TOLERATE BETA BLOCKER, PT BACK ON LISINOPRIL 10 MG A DAY.  Essential hypertension, benign     The hypertension is stable. UNABLE TO TOLERATE BETA BLOCKER, PT BACK ON LISINOPRIL 10 MG A DAY.      Hypertension     Hypothyroid     Mitral valve insufficiency and aortic valve insufficiency     Mild AI/mild MR, no symptoms    Mitral valve insufficiency and aortic valve insufficiency     Mild AI/mild MR, no symptoms     Neuropathy     Other primary cardiomyopathies 7/18/2013    ef decreased again to 35-40% unclear non ischemic etiology     Other specified cardiac dysrhythmias(427.89)     OCC PVC, one 4 beat SVT    Palpitations     10/12 recurrent post ablation of SVT in 9/12; EKG with S Tach in 130s today; obtain results of recent labs from PCP, obtain results if recent Holter from Dr. Cassie Quesada Paroxysmal supraventricular tachycardia [facility])     9/12 ablation    Paroxysmal supraventricular tachycardia ([address]     9/12 ablation     Psychiatric disorder        Family History   Problem Relation Age of Onset    Heart Attack Mother 52    Heart Attack Father 61    Heart Attack Other     Hypertension Other     Stroke Neg Hx     Sudden Death Neg Hx        Current Outpatient Medications   Medication Sig Dispense Refill    lidocaine (XYLOCAINE) 4 % topical cream Apply  to affected area three (3) times daily as needed for Pain. 1 Tube 0    ibuprofen (MOTRIN) 600 mg tablet Take 1 Tab by mouth every eight (8) hours. 15 Tab 0    metaxalone (SKELAXIN) 800 mg tablet Take 1 Tab by mouth four (4) times daily for 5 days. 20 Tab 0    FLUoxetine (PROZAC) 20 mg capsule Take 20 mg by mouth daily.  lisinopril (PRINIVIL, ZESTRIL) 5 mg tablet Take 5 mg by mouth daily.  diazePAM (VALIUM) 5 mg tablet Take 5 mg by mouth every six (6) hours as needed for Anxiety.  temazepam (RESTORIL) 30 mg capsule Take 30 mg by mouth nightly as needed for Sleep.  folic acid (FOLVITE) 1 mg tablet Take  by mouth daily.  lamoTRIgine (LAMICTAL) 100 mg tablet Take 100 mg by mouth daily.  omeprazole (PRILOSEC) 40 mg capsule Take 40 mg by mouth daily.  buPROPion SR (WELLBUTRIN SR) 150 mg SR tablet Take  by mouth two (2) times a day.            Allergies   Allergen Reactions    Latex Rash     PT DENIES    Neuromuscular Blockers, Steroidal Other (comments)     Hallucinations     Adhesive Rash    Augmentin [Amoxicillin-Pot Clavulanate] Diarrhea, Rash and Nausea and Vomiting    Coreg [Carvedilol] Diarrhea    Digoxin Diarrhea    Metoprolol Nausea and Vomiting    Toprol Xl [Metoprolol Succinate] Diarrhea     virtigo       Past Surgical History:   Procedure Laterality Date    CARDIAC SURG PROCEDURE UNLIST      ablation; 9/12 for SVT - slow pathway; Dr. Naveen Saha REPLACEMENT      HX BREAST REDUCTION      HX CARPAL TUNNEL RELEASE      HX  SECTION      x 2     HX CHOLECYSTECTOMY      HX DILATION AND CURETTAGE      HX HERNIA REPAIR      Incisional herniorrhaphy    HX ORTHOPAEDIC      right ankle and left foot    HX TONSILLECTOMY         Social History     Socioeconomic History    Marital status:      Spouse name: Not on file    Number of children: Not on file    Years of education: Not on file    Highest education level: Not on file   Occupational History    Not on file   Social Needs    Financial resource strain: Not on file    Food insecurity:     Worry: Not on file     Inability: Not on file    Transportation needs:     Medical: Not on file     Non-medical: Not on file   Tobacco Use    Smoking status: Never Smoker    Smokeless tobacco: Never Used   Substance and Sexual Activity    Alcohol use: No    Drug use: No    Sexual activity: Not on file   Lifestyle    Physical activity:     Days per week: Not on file     Minutes per session: Not on file    Stress: Not on file   Relationships    Social connections:     Talks on phone: Not on file     Gets together: Not on file     Attends Scientologist service: Not on file     Active member of club or organization: Not on file     Attends meetings of clubs or organizations: Not on file     Relationship status: Not on file    Intimate partner violence:     Fear of current or ex partner: Not on file     Emotionally abused: Not on file     Physically abused: Not on file     Forced sexual activity: Not on file   Other Topics Concern    Not on file   Social History Narrative    Not on file       REVIEW OF SYSTEMS:      CON: negative for recent weight loss/gain, fever, or chills  EYE: negative for double or blurry vision  ENT: negative for hoarseness  RS:   negative for cough, URI, SOB  CV:  negative for chest pain, palpitations  GI:    negative for blood in stool, nausea/vomiting  :  negative for blood in urine  MS: As per HPI  Other systems reviewed and noted below. PHYSICAL EXAMINATION:  Visit Vitals  /79   Pulse 86   Temp 97.3 °F (36.3 °C) (Oral)   Resp 16   Ht 5' 3\" (1.6 m)   Wt 165 lb (74.8 kg)   SpO2 100%   BMI 29.23 kg/m²     Body mass index is 29.23 kg/m². GENERAL: Alert and oriented x3, in no acute distress, well-developed, well-nourished. HEENT: Normocephalic, atraumatic. RESP: Non labored breathing with equal chest rise on inspiration. CV: Well perfused extremities. No cyanosis or clubbing noted. ABDOMEN: Soft, non-tender, non-distended. PHYSICAL EXAM:  Right knee on examination with minimal effusion. No warmth or erythema. Knee range of motion is limited by pain to 90 degrees of flexion. Full extension. Nontender over the medial and lateral joint line. Mild pain over the MCL. No pain with varus or valgus stress. No instability. Neurovascularly intact distally. IMAGING:  X-rays of the right knee were reviewed from the ER. These show arthritic changes in the knee. ASSESSMENT AND PLAN:   Elder Daniels is a 71year-old female with right knee arthritis. This seems to be a flare up of it.   I recommended conservative management and follow up if her symptoms persist.              Electronically signed by: Luster Galeazzi, MD

## 2019-07-03 ENCOUNTER — DOCUMENTATION ONLY (OUTPATIENT)
Dept: ORTHOPEDIC SURGERY | Age: 69
End: 2019-07-03

## 2019-09-14 ENCOUNTER — HOSPITAL ENCOUNTER (EMERGENCY)
Age: 69
Discharge: HOME | End: 2019-09-14
Attending: EMERGENCY MEDICINE
Payer: MEDICARE

## 2019-09-14 ENCOUNTER — APPOINTMENT (OUTPATIENT)
Dept: GENERAL RADIOLOGY | Age: 69
End: 2019-09-14
Attending: EMERGENCY MEDICINE
Payer: MEDICARE

## 2019-09-14 VITALS
WEIGHT: 165 LBS | TEMPERATURE: 97.8 F | HEART RATE: 93 BPM | RESPIRATION RATE: 16 BRPM | OXYGEN SATURATION: 100 % | BODY MASS INDEX: 29.23 KG/M2 | SYSTOLIC BLOOD PRESSURE: 165 MMHG | HEIGHT: 63 IN | DIASTOLIC BLOOD PRESSURE: 97 MMHG

## 2019-09-14 DIAGNOSIS — S16.1XXA STRAIN OF NECK MUSCLE, INITIAL ENCOUNTER: Primary | ICD-10-CM

## 2019-09-14 DIAGNOSIS — S00.81XA CHIN ABRASION, NON-INFECTED: ICD-10-CM

## 2019-09-14 PROCEDURE — 99282 EMERGENCY DEPT VISIT SF MDM: CPT

## 2019-09-14 PROCEDURE — 72040 X-RAY EXAM NECK SPINE 2-3 VW: CPT

## 2019-09-14 NOTE — ED TRIAGE NOTES
The pt was in this morning. She had a nightmare and rolled out of bed. She hit her chin and neck on the corner of her nightstand. She has an abrasion noted on her chin with controlled bleeding. She has redness and scrapes noted down the front of her neck to the top of her collar bone.

## 2019-09-14 NOTE — ED PROVIDER NOTES
HPI   Patient fell out of bed this morning and hit her on the nightstand. She presents today complaining of an abrasion to the bottom of her chin and bilateral neck soreness. She denies any loss of consciousness. She says she was awake when she hit the floor. She says her chin is mildly sore but her primary concern is neck soreness. She denies any chest pain extremity pain or other trauma. Past Medical History:   Diagnosis Date    CC (collagenous colitis)     Chronic combined systolic and diastolic heart failure ([facility] [address] 7/18/2013    sob on exertion,class3     Endometriosis     Essential hypertension, benign     The hypertension is stable. UNABLE TO TOLERATE BETA BLOCKER, PT BACK ON LISINOPRIL 10 MG A DAY.  Essential hypertension, benign     The hypertension is stable. UNABLE TO TOLERATE BETA BLOCKER, PT BACK ON LISINOPRIL 10 MG A DAY.      Hypertension     Hypothyroid     Mitral valve insufficiency and aortic valve insufficiency     Mild AI/mild MR, no symptoms    Mitral valve insufficiency and aortic valve insufficiency     Mild AI/mild MR, no symptoms     Neuropathy     Other primary cardiomyopathies 7/18/2013    ef decreased again to 35-40% unclear non ischemic etiology     Other specified cardiac dysrhythmias(427.89)     OCC PVC, one 4 beat SVT    Palpitations     10/12 recurrent post ablation of SVT in 9/12; EKG with S Tach in 130s today; obtain results of recent labs from PCP, obtain results if recent Holter from Dr. Mirela Story Paroxysmal supraventricular tachycardia ([facility] [address]     9/12 ablation    Paroxysmal supraventricular tachycardia ([facility] [address]     9/12 ablation     Psychiatric disorder        Past Surgical History:   Procedure Laterality Date    CARDIAC SURG PROCEDURE UNLIST      ablation; 9/12 for SVT - slow pathway; Dr. Mirela Story HX AORTIC VALVE REPLACEMENT      HX BREAST REDUCTION      HX [place]      x 2     HX CHOLECYSTECTOMY      HX DILATION AND CURETTAGE      HX HERNIA REPAIR      Incisional herniorrhaphy    HX ORTHOPAEDIC      right ankle and left foot    HX TONSILLECTOMY           Family History:   Problem Relation Age of Onset    Heart Attack Mother 52    Heart Attack Father 61    Heart Attack Other     Hypertension Other     Stroke Neg Hx     Sudden Death Neg Hx        Social History     Socioeconomic History    Marital status:      Spouse name: Not on file    Number of children: Not on file    Years of education: Not on file    Highest education level: Not on file   Occupational History    Not on file   Social Needs    Financial resource strain: Not on file    Food insecurity:     Worry: Not on file     Inability: Not on file    Transportation needs:     Medical: Not on file     Non-medical: Not on file   Tobacco Use    Smoking status: Never Smoker    Smokeless tobacco: Never Used   Substance and Sexual Activity    Alcohol use: No    Drug use: No    Sexual activity: Not on file   Lifestyle    Physical activity:     Days per week: Not on file     Minutes per session: Not on file    Stress: Not on file   Relationships    Social connections:     Talks on phone: Not on file     Gets together: Not on file     Attends Taoist service: Not on file     Active member of club or organization: Not on file     Attends meetings of clubs or organizations: Not on file     Relationship status: Not on file    Intimate partner violence:     Fear of current or ex partner: Not on file     Emotionally abused: Not on file     Physically abused: Not on file     Forced sexual activity: Not on file   Other Topics Concern    Not on file   Social History Narrative    Not on file         ALLERGIES: Latex; Neuromuscular blockers, steroidal; Adhesive; Augmentin [amoxicillin-pot clavulanate]; Coreg [carvedilol]; Digoxin; Metoprolol; and Toprol xl [metoprolol succinate]    Review of Systems   Constitutional: Negative. HENT: Negative. Eyes: Negative. Respiratory: Negative. Cardiovascular: Negative. Gastrointestinal: Negative. Genitourinary: Negative. Musculoskeletal: Positive for neck pain. Neurological: Negative. Psychiatric/Behavioral: Negative. Vitals:    09/14/19 1050   BP: (!) 165/97   Pulse: 93   Resp: 16   Temp: 97.8 °F (36.6 °C)   SpO2: 100%   Weight: 74.8 kg (165 lb)   Height: 5' 3\" (1.6 m)            Physical Exam   Constitutional: She is oriented to person, place, and time. She appears well-developed. HENT:   Head: Normocephalic. Mouth/Throat: Oropharynx is clear and moist.   There is a superficial abrasion to the mid central chin area. No laceration noted. Facial bones are all nontender to palpation with a normal bite. Eyes: Pupils are equal, round, and reactive to light. Conjunctivae and EOM are normal.   Neck: Normal range of motion. Neck supple. Mild diffuse paracervical muscle tenderness to palpation. No specific bony pain noted. Cardiovascular: Normal rate and regular rhythm. Pulmonary/Chest: Effort normal and breath sounds normal.   Abdominal: Soft. Musculoskeletal: Normal range of motion. Neurological: She is alert and oriented to person, place, and time. Skin: Skin is warm and dry. Psychiatric: She has a normal mood and affect. Nursing note and vitals reviewed. MDM         Procedures  I reviewed the results with the patient and her  they understand and agree with the disposition and follow-up plan.   Femi Gonzalez MD  12:12 PM

## 2019-09-14 NOTE — DISCHARGE INSTRUCTIONS
Patient Education        Neck Strain: Care Instructions  Your Care Instructions    You have strained the muscles and ligaments in your neck. A sudden, awkward movement can strain the neck. This often occurs with falls or car accidents or during certain sports. Everyday activities like working on a computer or sleeping can also cause neck strain if they force you to hold your neck in an awkward position for a long time. It is common for neck pain to get worse for a day or two after an injury, but it should start to feel better after that. You may have more pain and stiffness for several days before it gets better. This is expected. It may take a few weeks or longer for it to heal completely. Good home treatment can help you get better faster and avoid future neck problems. Follow-up care is a key part of your treatment and safety. Be sure to make and go to all appointments, and call your doctor if you are having problems. It's also a good idea to know your test results and keep a list of the medicines you take. How can you care for yourself at home? · If you were given a neck brace (cervical collar) to limit neck motion, wear it as instructed for as many days as your doctor tells you to. Do not wear it longer than you were told to. Wearing a brace for too long can make neck stiffness worse and weaken the neck muscles. · You can try using heat or ice to see if it helps. ? Try using a heating pad on a low or medium setting for 15 to 20 minutes every 2 to 3 hours. Try a warm shower in place of one session with the heating pad. You can also buy single-use heat wraps that last up to 8 hours. ? You can also try an ice pack for 10 to 15 minutes every 2 to 3 hours. · Take pain medicines exactly as directed. ? If the doctor gave you a prescription medicine for pain, take it as prescribed. ? If you are not taking a prescription pain medicine, ask your doctor if you can take an over-the-counter medicine.   · Gently rub the area to relieve pain and help with blood flow. Do not massage the area if it hurts to do so. · Do not do anything that makes the pain worse. Take it easy for a couple of days. You can do your usual activities if they do not hurt your neck or put it at risk for more stress or injury. · Try sleeping on a special neck pillow. Place it under your neck, not under your head. Placing a tightly rolled-up towel under your neck while you sleep will also work. If you use a neck pillow or rolled towel, do not use your regular pillow at the same time. · To prevent future neck pain, do exercises to stretch and strengthen your neck and back. Learn how to use good posture, safe lifting techniques, and proper body mechanics. When should you call for help? Call 911 anytime you think you may need emergency care. For example, call if:    · You are unable to move an arm or a leg at all.   [facility] your doctor now or seek immediate medical care if:    · You have new or worse symptoms in your arms, legs, chest, belly, or buttocks. Symptoms may include:  ? Numbness or tingling. ? Weakness. ? Pain.     · You lose bladder or bowel control.    Watch closely for changes in your health, and be sure to contact your doctor if:    · You are not getting better as expected. Where can you learn more? Go to http://tristan-audra.info/. Enter M253 in the search box to learn more about \"Neck Strain: Care Instructions. \"  Current as of: September 20, 2018  Content Version: 12.1  © 3009-0302 [employer]. Care instructions adapted under license by [employer] (which disclaims liability or warranty for this information). If you have questions about a medical condition or this instruction, always ask your healthcare professional. [address] any warranty or liability for your use of this information.

## 2019-10-11 ENCOUNTER — HOSPITAL ENCOUNTER (OUTPATIENT)
Age: 69
Discharge: HOME | End: 2019-10-11
Attending: OPTOMETRIST
Payer: MEDICARE

## 2019-10-11 DIAGNOSIS — H53.453 NASAL STEP VISUAL FIELD DEFECT OF BOTH EYES: ICD-10-CM

## 2019-10-11 LAB — CREAT UR-MCNC: 1.4 MG/DL (ref 0.6–1.3)

## 2019-10-11 PROCEDURE — 70549 MR ANGIOGRAPH NECK W/O&W/DYE: CPT

## 2019-10-11 PROCEDURE — A9577 INJ MULTIHANCE: HCPCS

## 2019-10-11 PROCEDURE — 82565 ASSAY OF CREATININE: CPT

## 2019-10-11 PROCEDURE — 70544 MR ANGIOGRAPHY HEAD W/O DYE: CPT

## 2019-10-11 PROCEDURE — 74011250636 HC RX REV CODE- 250/636

## 2019-10-11 RX ADMIN — GADOBENATE DIMEGLUMINE 20 ML: 529 INJECTION, SOLUTION INTRAVENOUS at 19:00

## 2019-10-17 ENCOUNTER — HOSPITAL ENCOUNTER (OUTPATIENT)
Dept: VASCULAR SURGERY | Age: 69
Discharge: HOME | End: 2019-10-17
Attending: OPTOMETRIST
Payer: MEDICARE

## 2019-10-17 ENCOUNTER — HOSPITAL ENCOUNTER (OUTPATIENT)
Age: 69
Discharge: HOME | End: 2019-10-17
Attending: OPTOMETRIST
Payer: MEDICARE

## 2019-10-17 DIAGNOSIS — H53.453 NASAL STEP VISUAL FIELD DEFECT OF BOTH EYES: ICD-10-CM

## 2019-10-17 PROCEDURE — 93880 EXTRACRANIAL BILAT STUDY: CPT

## 2019-10-17 PROCEDURE — A9575 INJ GADOTERATE MEGLUMI 0.1ML: HCPCS

## 2019-10-17 PROCEDURE — 74011636320 HC RX REV CODE- 636/320

## 2019-10-17 PROCEDURE — 70553 MRI BRAIN STEM W/O & W/DYE: CPT

## 2019-10-17 RX ADMIN — GADOTERATE MEGLUMINE 15 ML: 376.9 INJECTION INTRAVENOUS at 17:00

## 2019-10-18 LAB
LEFT CCA DIST DIAS: 15.5 CM/S
LEFT CCA DIST SYS: 60.7 CM/S
LEFT CCA MID DIAS: 17.12 CM/S
LEFT CCA MID SYS: 75.27 CM/S
LEFT CCA PROX DIAS: 20.4 CM/S
LEFT CCA PROX SYS: 89.8 CM/S
LEFT ECA DIAS: 10.66 CM/S
LEFT ECA SYS: 60.7 CM/S
LEFT ICA DIST DIAS: 21.1 CM/S
LEFT ICA DIST SYS: 90.4 CM/S
LEFT ICA MID DIAS: 26.8 CM/S
LEFT ICA MID SYS: 78.5 CM/S
LEFT ICA PROX DIAS: 12.3 CM/S
LEFT ICA PROX SYS: 43 CM/S
LEFT ICA/CCA SYS: 1.49
LEFT VERTEBRAL DIAS: 17.12 CM/S
LEFT VERTEBRAL SYS: 59.1 CM/S
RIGHT CCA DIST DIAS: 15.1 CM/S
RIGHT CCA DIST SYS: 46.4 CM/S
RIGHT CCA MID DIAS: 16.62 CM/S
RIGHT CCA MID SYS: 55.82 CM/S
RIGHT CCA PROX DIAS: 15.1 CM/S
RIGHT CCA PROX SYS: 73.1 CM/S
RIGHT ECA DIAS: 9.05 CM/S
RIGHT ECA SYS: 65.6 CM/S
RIGHT ICA DIST DIAS: 29.8 CM/S
RIGHT ICA DIST SYS: 89 CM/S
RIGHT ICA MID DIAS: 29.2 CM/S
RIGHT ICA MID SYS: 87.2 CM/S
RIGHT ICA PROX DIAS: 18.2 CM/S
RIGHT ICA PROX SYS: 46.4 CM/S
RIGHT ICA/CCA SYS: 1.9
RIGHT VERTEBRAL DIAS: 21.97 CM/S
RIGHT VERTEBRAL SYS: 68.8 CM/S

## 2020-01-28 ENCOUNTER — OFFICE VISIT (OUTPATIENT)
Dept: ORTHOPEDIC SURGERY | Facility: CLINIC | Age: 70
End: 2020-01-28

## 2020-01-28 VITALS
WEIGHT: 169.4 LBS | DIASTOLIC BLOOD PRESSURE: 76 MMHG | RESPIRATION RATE: 18 BRPM | OXYGEN SATURATION: 98 % | SYSTOLIC BLOOD PRESSURE: 149 MMHG | TEMPERATURE: 97.7 F | BODY MASS INDEX: 30.02 KG/M2 | HEIGHT: 63 IN

## 2020-01-28 DIAGNOSIS — M17.12 PRIMARY OSTEOARTHRITIS OF LEFT KNEE: Primary | ICD-10-CM

## 2020-01-28 DIAGNOSIS — M25.562 ACUTE PAIN OF LEFT KNEE: ICD-10-CM

## 2020-01-28 DIAGNOSIS — M54.2 NECK PAIN: ICD-10-CM

## 2020-01-28 RX ORDER — ASPIRIN 325 MG
325 TABLET ORAL DAILY
COMMUNITY

## 2020-01-28 RX ORDER — TRIAMCINOLONE ACETONIDE 40 MG/ML
40 INJECTION, SUSPENSION INTRA-ARTICULAR; INTRAMUSCULAR ONCE
Qty: 1 VIAL | Refills: 0
Start: 2020-01-28 | End: 2020-01-28

## 2020-01-28 NOTE — PROGRESS NOTES
Patient: Olivia Guzman                MRN: [id number]       SSN: [id number]  YOB: 1950        AGE: 79 y.o. SEX: female  Body mass index is 30.01 kg/m². PCP: LUNA Bishop  01/28/20    Chief Complaint: Left knee pain    HISTORY OF PRESENT ILLNESS:  Megha Patient presents to the office today for left knee pain. She also has some neck pain. The left knee pain is worse when she stands for long periods of time. She also had a fall last week where she rolled out of bed and landed on her face, as well as her left knee, which has caused her some increasing pain. She takes occasional over the counter medications. She has not had an injection in the left knee before. Past Medical History:   Diagnosis Date    CC (collagenous colitis)     Chronic combined systolic and diastolic heart failure ([address] 7/18/2013    sob on exertion,class3     Endometriosis     Essential hypertension, benign     The hypertension is stable. UNABLE TO TOLERATE BETA BLOCKER, PT BACK ON LISINOPRIL 10 MG A DAY.  Essential hypertension, benign     The hypertension is stable. UNABLE TO TOLERATE BETA BLOCKER, PT BACK ON LISINOPRIL 10 MG A DAY.      Hypertension     Hypothyroid     Mitral valve insufficiency and aortic valve insufficiency     Mild AI/mild MR, no symptoms    Mitral valve insufficiency and aortic valve insufficiency     Mild AI/mild MR, no symptoms     Neuropathy     Other primary cardiomyopathies 7/18/2013    ef decreased again to 35-40% unclear non ischemic etiology     Other specified cardiac dysrhythmias(427.89)     OCC PVC, one 4 beat SVT    Palpitations     10/12 recurrent post ablation of SVT in 9/12; EKG with S Tach in 130s today; obtain results of recent labs from PCP, obtain results if recent Holter from Dr. Elenita Varghese Paroxysmal supraventricular tachycardia ([address]     9/12 ablation    Paroxysmal supraventricular tachycardia ([address]     9/12 ablation     Psychiatric disorder Family History   Problem Relation Age of Onset    Heart Attack Mother 52    Heart Attack Father 61    Heart Attack Other     Hypertension Other     Stroke Neg Hx     Sudden Death Neg Hx        Current Outpatient Medications   Medication Sig Dispense Refill    aspirin (ASPIRIN) 325 mg tablet Take 325 mg by mouth daily.  triamcinolone acetonide (KENALOG-40) 40 mg/mL injection 1 mL by Intra artICUlar route once for 1 dose. 1 Vial 0    FLUoxetine (PROZAC) 20 mg capsule Take 20 mg by mouth daily.  lisinopril (PRINIVIL, ZESTRIL) 5 mg tablet Take 5 mg by mouth daily.  diazePAM (VALIUM) 5 mg tablet Take 5 mg by mouth every six (6) hours as needed for Anxiety.  temazepam (RESTORIL) 30 mg capsule Take 30 mg by mouth nightly as needed for Sleep.  folic acid (FOLVITE) 1 mg tablet Take  by mouth daily.  lamoTRIgine (LAMICTAL) 100 mg tablet Take 100 mg by mouth daily.  omeprazole (PRILOSEC) 40 mg capsule Take 40 mg by mouth daily.  buPROPion SR (WELLBUTRIN SR) 150 mg SR tablet Take  by mouth two (2) times a day.  lidocaine (XYLOCAINE) 4 % topical cream Apply  to affected area three (3) times daily as needed for Pain. 1 Tube 0    ibuprofen (MOTRIN) 600 mg tablet Take 1 Tab by mouth every eight (8) hours.  15 Tab 0       Allergies   Allergen Reactions    Latex Rash     PT DENIES    Neuromuscular Blockers, Steroidal Other (comments)     Hallucinations     Adhesive Rash    Augmentin [Amoxicillin-Pot Clavulanate] Diarrhea, Rash and Nausea and Vomiting    Coreg [Carvedilol] Diarrhea    Digoxin Diarrhea    Metoprolol Nausea and Vomiting    Toprol Xl [Metoprolol Succinate] Diarrhea     virtigo       Past Surgical History:   Procedure Laterality Date    CARDIAC SURG PROCEDURE UNLIST      ablation;  for SVT - slow pathway; Dr. Dejah Lindsey    HX AORTIC VALVE REPLACEMENT      HX BREAST REDUCTION      HX CARPAL TUNNEL RELEASE      HX  SECTION      x 2     HX CHOLECYSTECTOMY      HX DILATION AND CURETTAGE      HX HERNIA REPAIR      Incisional herniorrhaphy    HX ORTHOPAEDIC      right ankle and left foot    HX TONSILLECTOMY         Social History     Socioeconomic History    Marital status:      Spouse name: Not on file    Number of children: Not on file    Years of education: Not on file    Highest education level: Not on file   Occupational History    Not on file   Social Needs    Financial resource strain: Not on file    Food insecurity:     Worry: Not on file     Inability: Not on file    Transportation needs:     Medical: Not on file     Non-medical: Not on file   Tobacco Use    Smoking status: Never Smoker    Smokeless tobacco: Never Used   Substance and Sexual Activity    Alcohol use: No    Drug use: No    Sexual activity: Not on file   Lifestyle    Physical activity:     Days per week: Not on file     Minutes per session: Not on file    Stress: Not on file   Relationships    Social connections:     Talks on phone: Not on file     Gets together: Not on file     Attends [employer] service: Not on file     Active member of club or organization: Not on file     Attends meetings of clubs or organizations: Not on file     Relationship status: Not on file    Intimate partner violence:     Fear of current or ex partner: Not on file     Emotionally abused: Not on file     Physically abused: Not on file     Forced sexual activity: Not on file   Other Topics Concern    Not on file   Social History Narrative    Not on file       REVIEW OF SYSTEMS:      CON: negative for recent weight loss/gain, fever, or chills  EYE: negative for double or blurry vision  ENT: negative for hoarseness  RS:   negative for cough, URI, SOB  CV:  negative for chest pain, palpitations  GI:    negative for blood in stool, nausea/vomiting  :  negative for blood in urine  MS: As per HPI  Other systems reviewed and noted below.     PHYSICAL EXAMINATION:  Visit Vitals  BP 149/76   Temp 97.7 °F (36.5 °C) (Oral)   Resp 18   Ht 5' 3\" (1.6 m)   Wt 169 lb 6.4 oz (76.8 kg)   SpO2 98%   BMI 30.01 kg/m²     Body mass index is 30.01 kg/m². GENERAL: Alert and oriented x3, in no acute distress, well-developed, well-nourished. HEENT: Normocephalic, atraumatic. RESP: Non labored breathing with equal chest rise on inspiration. CV: Well perfused extremities. No cyanosis or clubbing noted. ABDOMEN: Soft, non-tender, non-distended. PHYSICAL EXAM:  Physical exam of the left knee with mild tenderness to palpation along the medial and lateral joint line. Full knee range of motion. No effusion today. No pain with Rosa Isela's. No pain with varus or valgus stress. She has tenderness to palpation over the pes insertion. She is otherwise neurovascularly intact. IMAGING:  X-rays taken of the left knee today showed tricompartmental osteoarthritis with a varus deformity and medial joint space narrowing and osteophytes. ASSESSMENT AND PLAN:   Luis Cooper has left knee osteoarthritis. She also has some neck pain. For the neck pain, I have recommended a referral to our spine service. For her left knee with the underlying arthritis, we will try to treat this conservatively. We discussed multiple treatment options. She would like to try a cortisone shot today. This was given in the left knee without complication. Follow up as needed.        [facility]  OFFICE PROCEDURE PROGRESS NOTE        Chart reviewed for the following:   Brennan Olmos MD, have reviewed the History, Physical and updated the Allergic reactions for [address] performed immediately prior to start of procedure:   Brennan Olmos MD, have performed the following reviews on Gordon Melvin prior to the start of the procedure:            * Patient was identified by name and date of birth   * Agreement on procedure being performed was verified  * Risks and Benefits explained to the patient  * Procedure site verified and marked as necessary  * Patient was positioned for comfort  * Consent was signed and verified    Time: 1415      Date of procedure: 1/28/2020    Procedure performed by:  Tamra Ortiz MD    Provider assisted by: Jan Benson LPN    Patient assisted by: self    How tolerated by patient: tolerated the procedure well with no complications    Post Procedural Pain Scale: 0 - No Hurt    Comments: none                  Electronically signed by: Tamra Ortiz MD

## 2020-02-14 ENCOUNTER — HOSPITAL ENCOUNTER (OUTPATIENT)
Dept: LAB | Age: 70
Discharge: HOME | End: 2020-02-14
Payer: MEDICARE

## 2020-02-14 DIAGNOSIS — E78.2 MIXED HYPERLIPIDEMIA: ICD-10-CM

## 2020-02-14 DIAGNOSIS — I51.9 MYXEDEMA HEART DISEASE: ICD-10-CM

## 2020-02-14 DIAGNOSIS — N18.30 CHRONIC KIDNEY DISEASE, STAGE III (MODERATE) (HCC): ICD-10-CM

## 2020-02-14 DIAGNOSIS — I10 ESSENTIAL HYPERTENSION, MALIGNANT: ICD-10-CM

## 2020-02-14 DIAGNOSIS — E03.9 MYXEDEMA HEART DISEASE: ICD-10-CM

## 2020-02-14 DIAGNOSIS — E53.8 VITAMIN B12 DEFICIENCY (NON ANEMIC): ICD-10-CM

## 2020-02-14 LAB
ALBUMIN SERPL-MCNC: 3.7 G/DL (ref 3.4–5)
ALBUMIN/GLOB SERPL: 1.3 {RATIO} (ref 0.8–1.7)
ALP SERPL-CCNC: 67 U/L (ref 45–117)
ALT SERPL-CCNC: 20 U/L (ref 13–56)
ANION GAP SERPL CALC-SCNC: 3 MMOL/L (ref 3–18)
AST SERPL-CCNC: 13 U/L (ref 10–38)
BASOPHILS # BLD: 0 K/UL (ref 0–0.1)
BASOPHILS NFR BLD: 1 % (ref 0–2)
BILIRUB SERPL-MCNC: 0.4 MG/DL (ref 0.2–1)
BUN SERPL-MCNC: 15 MG/DL (ref 7–18)
BUN/CREAT SERPL: 13 (ref 12–20)
CALCIUM SERPL-MCNC: 8.6 MG/DL (ref 8.5–10.1)
CHLORIDE SERPL-SCNC: 108 MMOL/L (ref 100–111)
CHOLEST SERPL-MCNC: 222 MG/DL
CO2 SERPL-SCNC: 29 MMOL/L (ref 21–32)
CREAT SERPL-MCNC: 1.16 MG/DL (ref 0.6–1.3)
DIFFERENTIAL METHOD BLD: ABNORMAL
EOSINOPHIL # BLD: 0.1 K/UL (ref 0–0.4)
EOSINOPHIL NFR BLD: 2 % (ref 0–5)
ERYTHROCYTE [DISTWIDTH] IN BLOOD BY AUTOMATED COUNT: 14 % (ref 11.6–14.5)
FOLATE SERPL-MCNC: >20 NG/ML (ref 3.1–17.5)
GLOBULIN SER CALC-MCNC: 2.9 G/DL (ref 2–4)
GLUCOSE SERPL-MCNC: 88 MG/DL (ref 74–99)
HCT VFR BLD AUTO: 39.5 % (ref 35–45)
HDLC SERPL-MCNC: 55 MG/DL (ref 40–60)
HDLC SERPL: 4 {RATIO} (ref 0–5)
HGB BLD-MCNC: 13 G/DL (ref 12–16)
LDLC SERPL CALC-MCNC: 143 MG/DL (ref 0–100)
LIPID PROFILE,FLP: ABNORMAL
LYMPHOCYTES # BLD: 0.9 K/UL (ref 0.9–3.6)
LYMPHOCYTES NFR BLD: 21 % (ref 21–52)
MCH RBC QN AUTO: 29 PG (ref 24–34)
MCHC RBC AUTO-ENTMCNC: 32.9 G/DL (ref 31–37)
MCV RBC AUTO: 88.2 FL (ref 74–97)
MONOCYTES # BLD: 0.6 K/UL (ref 0.05–1.2)
MONOCYTES NFR BLD: 13 % (ref 3–10)
NEUTS SEG # BLD: 2.9 K/UL (ref 1.8–8)
NEUTS SEG NFR BLD: 63 % (ref 40–73)
PLATELET # BLD AUTO: 181 K/UL (ref 135–420)
PMV BLD AUTO: 11.4 FL (ref 9.2–11.8)
POTASSIUM SERPL-SCNC: 4.4 MMOL/L (ref 3.5–5.5)
PROT SERPL-MCNC: 6.6 G/DL (ref 6.4–8.2)
RBC # BLD AUTO: 4.48 M/UL (ref 4.2–5.3)
SODIUM SERPL-SCNC: 140 MMOL/L (ref 136–145)
T4 FREE SERPL-MCNC: 0.8 NG/DL (ref 0.7–1.5)
TRIGL SERPL-MCNC: 120 MG/DL (ref ?–150)
TSH SERPL DL<=0.05 MIU/L-ACNC: 1.63 UIU/ML (ref 0.36–3.74)
VIT B12 SERPL-MCNC: 368 PG/ML (ref 211–911)
VLDLC SERPL CALC-MCNC: 24 MG/DL
WBC # BLD AUTO: 4.5 K/UL (ref 4.6–13.2)

## 2020-02-14 PROCEDURE — 84443 ASSAY THYROID STIM HORMONE: CPT

## 2020-02-14 PROCEDURE — 80061 LIPID PANEL: CPT

## 2020-02-14 PROCEDURE — 85025 COMPLETE CBC W/AUTO DIFF WBC: CPT

## 2020-02-14 PROCEDURE — 36415 COLL VENOUS BLD VENIPUNCTURE: CPT

## 2020-02-14 PROCEDURE — 82607 VITAMIN B-12: CPT

## 2020-02-14 PROCEDURE — 84439 ASSAY OF FREE THYROXINE: CPT

## 2020-02-14 PROCEDURE — 80053 COMPREHEN METABOLIC PANEL: CPT

## 2020-02-27 ENCOUNTER — HOSPITAL ENCOUNTER (OUTPATIENT)
Dept: MAMMOGRAPHY | Age: 70
Discharge: HOME | End: 2020-02-27
Attending: PHYSICIAN ASSISTANT
Payer: MEDICARE

## 2020-02-27 DIAGNOSIS — Z12.31 ENCOUNTER FOR SCREENING MAMMOGRAM FOR BREAST CANCER: ICD-10-CM

## 2020-02-27 PROCEDURE — 77063 BREAST TOMOSYNTHESIS BI: CPT

## 2021-03-23 ENCOUNTER — TRANSCRIBE ORDER (OUTPATIENT)
Dept: SCHEDULING | Age: 71
End: 2021-03-23

## 2021-03-23 DIAGNOSIS — Z12.31 SCREENING MAMMOGRAM, ENCOUNTER FOR: Primary | ICD-10-CM

## 2021-04-14 ENCOUNTER — HOSPITAL ENCOUNTER (OUTPATIENT)
Dept: MAMMOGRAPHY | Age: 71
Discharge: HOME | End: 2021-04-14
Attending: PHYSICIAN ASSISTANT
Payer: MEDICARE

## 2021-04-14 DIAGNOSIS — Z12.31 SCREENING MAMMOGRAM, ENCOUNTER FOR: ICD-10-CM

## 2021-04-14 PROCEDURE — 77063 BREAST TOMOSYNTHESIS BI: CPT

## 2021-04-23 ENCOUNTER — TRANSCRIBE ORDER (OUTPATIENT)
Dept: SCHEDULING | Age: 71
End: 2021-04-23

## 2021-04-23 DIAGNOSIS — R92.8 ABNORMAL MAMMOGRAM: Primary | ICD-10-CM

## 2021-04-27 ENCOUNTER — HOSPITAL ENCOUNTER (OUTPATIENT)
Dept: ULTRASOUND IMAGING | Age: 71
Discharge: HOME | End: 2021-04-27
Attending: PHYSICIAN ASSISTANT
Payer: MEDICARE

## 2021-04-27 ENCOUNTER — HOSPITAL ENCOUNTER (OUTPATIENT)
Dept: MAMMOGRAPHY | Age: 71
Discharge: HOME | End: 2021-04-27
Attending: PHYSICIAN ASSISTANT
Payer: MEDICARE

## 2021-04-27 DIAGNOSIS — R92.8 ABNORMAL MAMMOGRAM: ICD-10-CM

## 2021-04-27 PROCEDURE — 76642 ULTRASOUND BREAST LIMITED: CPT

## 2021-04-27 PROCEDURE — 77061 BREAST TOMOSYNTHESIS UNI: CPT

## 2021-05-04 ENCOUNTER — HOSPITAL ENCOUNTER (OUTPATIENT)
Dept: ULTRASOUND IMAGING | Age: 71
Discharge: HOME | End: 2021-05-04
Attending: PHYSICIAN ASSISTANT
Payer: MEDICARE

## 2021-05-04 ENCOUNTER — HOSPITAL ENCOUNTER (OUTPATIENT)
Dept: MAMMOGRAPHY | Age: 71
Discharge: HOME | End: 2021-05-04
Attending: PHYSICIAN ASSISTANT
Payer: MEDICARE

## 2021-05-04 DIAGNOSIS — R92.8 ABNORMAL MAMMOGRAM: ICD-10-CM

## 2021-05-04 DIAGNOSIS — N63.0 LUMP OR MASS IN BREAST: ICD-10-CM

## 2021-05-04 PROCEDURE — 88305 TISSUE EXAM BY PATHOLOGIST: CPT

## 2021-05-04 PROCEDURE — A4648 IMPLANTABLE TISSUE MARKER: HCPCS

## 2021-05-04 PROCEDURE — 74011000250 HC RX REV CODE- 250

## 2021-05-04 PROCEDURE — 77065 DX MAMMO INCL CAD UNI: CPT

## 2021-05-04 RX ORDER — LIDOCAINE HYDROCHLORIDE 10 MG/ML
5 INJECTION, SOLUTION EPIDURAL; INFILTRATION; INTRACAUDAL; PERINEURAL
Status: COMPLETED | OUTPATIENT
Start: 2021-05-04 | End: 2021-05-04

## 2021-05-04 RX ORDER — LIDOCAINE HYDROCHLORIDE AND EPINEPHRINE 10; 10 MG/ML; UG/ML
1.5 INJECTION, SOLUTION INFILTRATION; PERINEURAL
Status: COMPLETED | OUTPATIENT
Start: 2021-05-04 | End: 2021-05-04

## 2021-05-04 RX ADMIN — LIDOCAINE HYDROCHLORIDE 5 ML: 10 INJECTION, SOLUTION EPIDURAL; INFILTRATION; INTRACAUDAL; PERINEURAL at 09:40

## 2021-05-04 RX ADMIN — LIDOCAINE HYDROCHLORIDE AND EPINEPHRINE 15 MG: 10; 10 INJECTION, SOLUTION INFILTRATION; PERINEURAL at 09:40

## 2021-05-21 ENCOUNTER — TRANSCRIBE ORDER (OUTPATIENT)
Dept: SCHEDULING | Age: 71
End: 2021-05-21

## 2021-05-21 DIAGNOSIS — M79.89 MASS OF SOFT TISSUE OF UPPER ARM: Primary | ICD-10-CM

## 2021-06-09 ENCOUNTER — HOSPITAL ENCOUNTER (OUTPATIENT)
Dept: ULTRASOUND IMAGING | Age: 71
Discharge: HOME | End: 2021-06-09
Attending: PHYSICIAN ASSISTANT
Payer: MEDICARE

## 2021-06-09 DIAGNOSIS — M79.89 MASS OF SOFT TISSUE OF UPPER ARM: ICD-10-CM

## 2021-06-09 PROCEDURE — 76882 US LMTD JT/FCL EVL NVASC XTR: CPT

## 2022-04-08 ENCOUNTER — OFFICE VISIT (OUTPATIENT)
Dept: ORTHOPEDIC SURGERY | Age: 72
End: 2022-04-08
Payer: MEDICARE

## 2022-04-08 VITALS — WEIGHT: 161 LBS | BODY MASS INDEX: 28.53 KG/M2 | OXYGEN SATURATION: 97 % | HEART RATE: 115 BPM | HEIGHT: 63 IN

## 2022-04-08 DIAGNOSIS — M17.11 PRIMARY OSTEOARTHRITIS OF RIGHT KNEE: Primary | ICD-10-CM

## 2022-04-08 PROCEDURE — G8419 CALC BMI OUT NRM PARAM NOF/U: HCPCS | Performed by: ORTHOPAEDIC SURGERY

## 2022-04-08 PROCEDURE — G9899 SCRN MAM PERF RSLTS DOC: HCPCS | Performed by: ORTHOPAEDIC SURGERY

## 2022-04-08 PROCEDURE — 1090F PRES/ABSN URINE INCON ASSESS: CPT | Performed by: ORTHOPAEDIC SURGERY

## 2022-04-08 PROCEDURE — 3017F COLORECTAL CA SCREEN DOC REV: CPT | Performed by: ORTHOPAEDIC SURGERY

## 2022-04-08 PROCEDURE — G8400 PT W/DXA NO RESULTS DOC: HCPCS | Performed by: ORTHOPAEDIC SURGERY

## 2022-04-08 PROCEDURE — 1101F PT FALLS ASSESS-DOCD LE1/YR: CPT | Performed by: ORTHOPAEDIC SURGERY

## 2022-04-08 PROCEDURE — G8432 DEP SCR NOT DOC, RNG: HCPCS | Performed by: ORTHOPAEDIC SURGERY

## 2022-04-08 PROCEDURE — G8756 NO BP MEASURE DOC: HCPCS | Performed by: ORTHOPAEDIC SURGERY

## 2022-04-08 PROCEDURE — 99214 OFFICE O/P EST MOD 30 MIN: CPT | Performed by: ORTHOPAEDIC SURGERY

## 2022-04-08 PROCEDURE — 20610 DRAIN/INJ JOINT/BURSA W/O US: CPT | Performed by: ORTHOPAEDIC SURGERY

## 2022-04-08 PROCEDURE — G8536 NO DOC ELDER MAL SCRN: HCPCS | Performed by: ORTHOPAEDIC SURGERY

## 2022-04-08 PROCEDURE — G8427 DOCREV CUR MEDS BY ELIG CLIN: HCPCS | Performed by: ORTHOPAEDIC SURGERY

## 2022-04-08 RX ORDER — DICLOFENAC SODIUM 10 MG/G
2 GEL TOPICAL 4 TIMES DAILY
Qty: 100 G | Refills: 2 | Status: SHIPPED | OUTPATIENT
Start: 2022-04-08

## 2022-04-08 RX ORDER — TRIAMCINOLONE ACETONIDE 40 MG/ML
40 INJECTION, SUSPENSION INTRA-ARTICULAR; INTRAMUSCULAR ONCE
Status: COMPLETED | OUTPATIENT
Start: 2022-04-08 | End: 2022-04-08

## 2022-04-08 RX ADMIN — TRIAMCINOLONE ACETONIDE 40 MG: 40 INJECTION, SUSPENSION INTRA-ARTICULAR; INTRAMUSCULAR at 14:37

## 2022-04-08 NOTE — PROGRESS NOTES
Patient: Kris Rock                MRN: [id number]       SSN: [id number]  YOB: 1950        AGE: 67 y.o. SEX: female  Body mass index is 28.52 kg/m². PCP: LUNA Julian  04/08/22    Chief Complaint: Right knee pain 8/10    HPI: Kris Rock is a 67 y.o. female patient who presents to the office today with right knee pain. The pain is worse with walking. She notices locking catching popping and stiffness. She reports the pain is present throughout the day. She cannot take anti-inflammatories due to a history of kidney disease. Past Medical History:   Diagnosis Date    CC (collagenous colitis)     Chronic combined systolic and diastolic heart failure ([address] 7/18/2013    sob on exertion,class3     Endometriosis     Essential hypertension, benign     The hypertension is stable. UNABLE TO TOLERATE BETA BLOCKER, PT BACK ON LISINOPRIL 10 MG A DAY.  Essential hypertension, benign     The hypertension is stable. UNABLE TO TOLERATE BETA BLOCKER, PT BACK ON LISINOPRIL 10 MG A DAY.      Hypertension     Hypothyroid     Mitral valve insufficiency and aortic valve insufficiency     Mild AI/mild MR, no symptoms    Mitral valve insufficiency and aortic valve insufficiency     Mild AI/mild MR, no symptoms     Neuropathy     Other primary cardiomyopathies 7/18/2013    ef decreased again to 35-40% unclear non ischemic etiology     Other specified cardiac dysrhythmias(427.89)     OCC PVC, one 4 beat SVT    Palpitations     10/12 recurrent post ablation of SVT in 9/12; EKG with S Tach in 130s today; obtain results of recent labs from PCP, obtain results if recent Holter from Dr. Pablo Live Paroxysmal supraventricular tachycardia ([address]     9/12 ablation    Paroxysmal supraventricular tachycardia ([address]     9/12 ablation     Psychiatric disorder        Family History   Problem Relation Age of Onset    Heart Attack Mother 52    Heart Attack Father 61    Heart Attack Other     Hypertension Other     Stroke Neg Hx     Sudden Death Neg Hx        Current Outpatient Medications   Medication Sig Dispense Refill    aspirin (ASPIRIN) 325 mg tablet Take 325 mg by mouth daily.  lidocaine (XYLOCAINE) 4 % topical cream Apply  to affected area three (3) times daily as needed for Pain. 1 Tube 0    ibuprofen (MOTRIN) 600 mg tablet Take 1 Tab by mouth every eight (8) hours. 15 Tab 0    FLUoxetine (PROZAC) 20 mg capsule Take 20 mg by mouth daily.  lisinopril (PRINIVIL, ZESTRIL) 5 mg tablet Take 5 mg by mouth daily.  diazePAM (VALIUM) 5 mg tablet Take 5 mg by mouth every six (6) hours as needed for Anxiety.  folic acid (FOLVITE) 1 mg tablet Take  by mouth daily.  lamoTRIgine (LAMICTAL) 100 mg tablet Take 100 mg by mouth daily.  omeprazole (PRILOSEC) 40 mg capsule Take 40 mg by mouth daily.  buPROPion SR (WELLBUTRIN SR) 150 mg SR tablet Take  by mouth two (2) times a day.  temazepam (RESTORIL) 30 mg capsule Take 30 mg by mouth nightly as needed for Sleep.  (Patient not taking: Reported on 2022)         Allergies   Allergen Reactions    Latex Rash     PT DENIES    Neuromuscular Blockers, Steroidal Other (comments)     Hallucinations     Adhesive Rash    Augmentin [Amoxicillin-Pot Clavulanate] Diarrhea, Rash and Nausea and Vomiting    Coreg [Carvedilol] Diarrhea    Digoxin Diarrhea    Metoprolol Nausea and Vomiting    Toprol Xl [Metoprolol Succinate] Diarrhea     virtigo       Past Surgical History:   Procedure Laterality Date    HX AORTIC VALVE REPLACEMENT      HX BREAST REDUCTION      HX CARPAL TUNNEL RELEASE      HX  SECTION      x 2     HX CHOLECYSTECTOMY      HX DILATION AND CURETTAGE      HX HERNIA REPAIR      Incisional herniorrhaphy    HX ORTHOPAEDIC      right ankle and left foot    HX TONSILLECTOMY      [place] CARDIAC SURG PROCEDURE UNLIST      ablation;  for SVT - slow pathway; Dr. Edna Saber History     Socioeconomic History    Marital status:      Spouse name: Not on file    Number of children: Not on file    Years of education: Not on file    Highest education level: Not on file   Occupational History    Not on file   Tobacco Use    Smoking status: Never Smoker    Smokeless tobacco: Never Used   Substance and Sexual Activity    Alcohol use: No    Drug use: No    Sexual activity: Not on file   Other Topics Concern    Not on file   Social History Narrative    Not on file     Social Determinants of Health     Financial Resource Strain:     Difficulty of Paying Living Expenses: Not on file   Food Insecurity:     Worried About Running Out of Food in the Last Year: Not on file    Melany of Food in the Last Year: Not on file   Transportation Needs:     Lack of Transportation (Medical): Not on file    Lack of Transportation (Non-Medical): Not on file   Physical Activity:     Days of Exercise per Week: Not on file    Minutes of Exercise per Session: Not on file   Stress:     Feeling of Stress : Not on file   Social Connections:     Frequency of Communication with Friends and Family: Not on file    Frequency of Social Gatherings with Friends and Family: Not on file    Attends [employer] Services: Not on file    Active Member of [address] [place] [address] or Organizations: Not on file    Attends Club or Organization Meetings: Not on file    Marital Status: Not on file   Intimate Partner Violence:     Fear of Current or Ex-Partner: Not on file    Emotionally Abused: Not on file    Physically Abused: Not on file    Sexually Abused: Not on file   Housing Stability:     Unable to Pay for Housing in the Last Year: Not on file    Number of [place] in the Last Year: Not on file    Unstable Housing in the Last Year: Not on file       REVIEW OF SYSTEMS:      No changes from previous review of systems unless noted.     PHYSICAL EXAMINATION:  Visit Vitals  Pulse (!) 115 Comment: states she is nervous Ht 5' 3\" (1.6 m)   Wt 161 lb (73 kg)   SpO2 97%   BMI 28.52 kg/m²     Body mass index is 28.52 kg/m². GENERAL: Alert and oriented x3, in no acute distress. HEENT: Normocephalic, atraumatic. Knee Examination      R   L  Effusion   -   -  Warmth   -   -  Erythema   -   -  ROM   Extension  Full   Full   Flexion   Full   Full  Tenderness   Medial Joint  +   -   Lateral Joint  +   -   Posterior knee  -   -  Strength   Quad   5   5   Hamstring  5   5  Crepitus   Tibiofemoral   +   -   Patellofemoral  +   -  Instability   Anterior  -   -   Posterior  -   -   Patellofemoral  -   -  Lachman's   -   -  Anterior Drawer  -   -  Posterior Drawer  -   -  Rosa Isela's   Pain   -   -   Locking  -   -  Calf TTP   -   -  Straight Leg Raise  -   -      IMAGING:  Previously taken x-rays of the right knee were reviewed. These show osteoarthritis in the right knee with osteophyte formation and medial joint space narrowing as well as subchondral sclerosis by my interpretation. ASSESSMENT & PLAN  Diagnosis: Right knee osteoarthritis    Ronda montero has symptomatic right knee osteoarthritis. We discussed the multiple new treatments today. I recommended a topical anti-inflammatory because she cannot take oral anti-inflammatories due to history of kidney disease. Also recommended a corticosteroid injection. We discussed the possibility of surgery in the form of total knee replacement in referral and she would like to hold off on that for now. Follow-up as needed. Prescription medication management discussed with patient.      [facility]  OFFICE PROCEDURE PROGRESS NOTE        Chart reviewed for the following:   I, Bishop Maria E MD, have reviewed the History, Physical and updated the Allergic reactions for [address] [facility] performed immediately prior to start of procedure:   Elian Hood MD, have performed the following reviews on He Rose prior to the start of the procedure: * Patient was identified by name and date of birth   * Agreement on procedure being performed was verified  * Risks and Benefits explained to the patient  * Procedure site verified and marked as necessary  * Patient was positioned for comfort  * Consent was signed and verified    Time: 2:35 PM    Location: Right knee joint intra-articular injection    Kenalog 40mg & 3cc Lidocaine    Date of procedure: 4/8/2022    Procedure performed by:  Rachana Moon MD    Provider assisted by: Breanna Starkey LPN    Patient assisted by: self    How tolerated by patient: tolerated the procedure well with no complications    Post Procedural Pain Scale: 0 - No Hurt    Comments: none      Electronically signed by: Rachana Moon MD    Note: This note was completed using voice recognition software.   Any typographical/name errors or mistakes are unintentional.

## 2022-06-30 ENCOUNTER — TRANSCRIBE ORDER (OUTPATIENT)
Dept: SCHEDULING | Age: 72
End: 2022-06-30

## 2022-06-30 DIAGNOSIS — Z12.31 SCREENING MAMMOGRAM, ENCOUNTER FOR: Primary | ICD-10-CM

## 2022-07-14 ENCOUNTER — HOSPITAL ENCOUNTER (OUTPATIENT)
Dept: MAMMOGRAPHY | Age: 72
Discharge: HOME | End: 2022-07-14
Attending: PHYSICIAN ASSISTANT
Payer: MEDICARE

## 2022-07-14 DIAGNOSIS — Z12.31 SCREENING MAMMOGRAM, ENCOUNTER FOR: ICD-10-CM

## 2022-07-14 PROCEDURE — 77063 BREAST TOMOSYNTHESIS BI: CPT

## 2022-08-19 ENCOUNTER — OFFICE VISIT (OUTPATIENT)
Dept: ORTHOPEDIC SURGERY | Age: 72
End: 2022-08-19
Payer: MEDICARE

## 2022-08-19 DIAGNOSIS — M17.11 PRIMARY OSTEOARTHRITIS OF RIGHT KNEE: Primary | ICD-10-CM

## 2022-08-19 PROCEDURE — 20610 DRAIN/INJ JOINT/BURSA W/O US: CPT | Performed by: ORTHOPAEDIC SURGERY

## 2022-08-19 RX ORDER — PANTOPRAZOLE SODIUM 40 MG/1
TABLET, DELAYED RELEASE ORAL
COMMUNITY
Start: 2022-07-26

## 2022-08-19 RX ORDER — TRIAMCINOLONE ACETONIDE 40 MG/ML
40 INJECTION, SUSPENSION INTRA-ARTICULAR; INTRAMUSCULAR ONCE
Status: COMPLETED | OUTPATIENT
Start: 2022-08-19 | End: 2022-08-19

## 2022-08-19 RX ADMIN — TRIAMCINOLONE ACETONIDE 40 MG: 40 INJECTION, SUSPENSION INTRA-ARTICULAR; INTRAMUSCULAR at 11:41

## 2022-08-19 NOTE — PROGRESS NOTES
Patient: Rico Soriano                MRN: [id number]       SSN: [id number]  YOB: 1950        AGE: 67 y.o. SEX: female  There is no height or weight on file to calculate BMI. PCP: LUNA Balderrama  08/19/22    Chief Complaint: Right knee follow-up    HPI: Rico Soriano is a 67 y.o. female patient who returns today for her right knee. At her last visit she received an injection into the right knee which she said gave her pain relief. Recently she has been noticing some increasing swelling and even a rash over the knee. She does not report any injury or trauma to the right knee. Past Medical History:   Diagnosis Date    CC (collagenous colitis)     Chronic combined systolic and diastolic heart failure ([address] 7/18/2013    sob on exertion,class3     Endometriosis     Essential hypertension, benign     The hypertension is stable. UNABLE TO TOLERATE BETA BLOCKER, PT BACK ON LISINOPRIL 10 MG A DAY. Essential hypertension, benign     The hypertension is stable. UNABLE TO TOLERATE BETA BLOCKER, PT BACK ON LISINOPRIL 10 MG A DAY.      Hypertension     Hypothyroid     Mitral valve insufficiency and aortic valve insufficiency     Mild AI/mild MR, no symptoms    Mitral valve insufficiency and aortic valve insufficiency     Mild AI/mild MR, no symptoms     Neuropathy     Other primary cardiomyopathies 7/18/2013    ef decreased again to 35-40% unclear non ischemic etiology     Other specified cardiac dysrhythmias(427.89)     OCC PVC, one 4 beat SVT    Palpitations     10/12 recurrent post ablation of SVT in 9/12; EKG with S Tach in 130s today; obtain results of recent labs from PCP, obtain results if recent Holter from Dr. Blair Echols    Paroxysmal supraventricular tachycardia ([address]     9/12 ablation    Paroxysmal supraventricular tachycardia ([address]     9/12 ablation     Psychiatric disorder        Family History   Problem Relation Age of Onset    Heart Attack Mother 52    Heart Attack Father 61    Heart Attack Other     Hypertension Other     Stroke Neg Hx     Sudden Death Neg Hx        Current Outpatient Medications   Medication Sig Dispense Refill    pantoprazole (PROTONIX) 40 mg tablet TAKE 1 TABLET BY MOUTH DAILY 30 MINUTES BEFORE BREAKFAST      diclofenac (VOLTAREN) 1 % gel Apply 2 g to affected area four (4) times daily. 100 g 2    aspirin (ASPIRIN) 325 mg tablet Take 325 mg by mouth daily. lidocaine (XYLOCAINE) 4 % topical cream Apply  to affected area three (3) times daily as needed for Pain. 1 Tube 0    ibuprofen (MOTRIN) 600 mg tablet Take 1 Tab by mouth every eight (8) hours. 15 Tab 0    FLUoxetine (PROZAC) 20 mg capsule Take 20 mg by mouth daily. lisinopril (PRINIVIL, ZESTRIL) 5 mg tablet Take 5 mg by mouth daily. diazePAM (VALIUM) 5 mg tablet Take 5 mg by mouth every six (6) hours as needed for Anxiety. temazepam (RESTORIL) 30 mg capsule Take 30 mg by mouth nightly as needed for Sleep. (Patient not taking: Reported on 8/2/7294)      folic acid (FOLVITE) 1 mg tablet Take  by mouth daily. lamoTRIgine (LAMICTAL) 100 mg tablet Take 100 mg by mouth daily. omeprazole (PRILOSEC) 40 mg capsule Take 40 mg by mouth daily. buPROPion SR (WELLBUTRIN SR) 150 mg SR tablet Take  by mouth two (2) times a day.          Current Facility-Administered Medications   Medication Dose Route Frequency Provider Last Rate Last Admin    triamcinolone acetonide (KENALOG-40) 40 mg/mL injection 40 mg  40 mg Intra artICUlar ONCE Huttman, Mickiel Moritz, MD           Allergies   Allergen Reactions    Latex Rash     PT DENIES    Neuromuscular Blockers, Steroidal Other (comments)     Hallucinations     Adhesive Rash    Augmentin [Amoxicillin-Pot Clavulanate] Diarrhea, Rash and Nausea and Vomiting    Coreg [Carvedilol] Diarrhea    Digoxin Diarrhea    Metoprolol Nausea and Vomiting    Toprol Xl [Metoprolol Succinate] Diarrhea     virtigo       Past Surgical History:   Procedure Laterality Date    HX AORTIC VALVE REPLACEMENT      HX BREAST REDUCTION      HX CARPAL TUNNEL RELEASE      HX  SECTION      x 2     HX CHOLECYSTECTOMY      HX DILATION AND CURETTAGE      HX HERNIA REPAIR      Incisional herniorrhaphy    HX ORTHOPAEDIC      right ankle and left foot    HX TONSILLECTOMY      [place] CARDIAC SURG PROCEDURE UNLIST      ablation;  for SVT - slow pathway; Dr. Pat Carrizales History     Socioeconomic History    Marital status:      Spouse name: Not on file    Number of children: Not on file    Years of education: Not on file    Highest education level: Not on file   Occupational History    Not on file   Tobacco Use    Smoking status: Never    Smokeless tobacco: Never   Vaping Use    Vaping Use: Never used   Substance and Sexual Activity    Alcohol use: No    Drug use: No    Sexual activity: Not on file   Other Topics Concern    Not on file   Social History Narrative    Not on file     Social Determinants of Health     Financial Resource Strain: Not on file   Food Insecurity: Not on file   Transportation Needs: Not on file   Physical Activity: Not on file   Stress: Not on file   Social Connections: Not on file   Intimate Partner Violence: Not on file   Housing Stability: Not on file       REVIEW OF SYSTEMS:      No changes from previous review of systems unless noted. PHYSICAL EXAMINATION:  There were no vitals taken for this visit. There is no height or weight on file to calculate BMI. GENERAL: Alert and oriented x3, in no acute distress. HEENT: Normocephalic, atraumatic.     Knee Examination      R   L  Effusion   -   -  Warmth   -   -  Erythema   -   -  ROM   Extension  Full   Full   Flexion   Full   Full  Tenderness   Medial Joint  +   -   Lateral Joint  -   -   Posterior knee  -   -  Strength   Quad   5   5   Hamstring  5   5  Crepitus   Tibiofemoral -   -   Patellofemoral  -   -  Instability   Anterior  -   -   Posterior  -   -   Patellofemoral  -   -  Lachman's   -   -  Anterior Drawer  -   -  Posterior Drawer  -   -  Rosa Isela's   Pain   -   -   Locking  -   -  Calf TTP   -   -  Straight Leg Raise  -   -  Swelling over the skin superficial to the knee joint. I do not appreciate a large effusion today. There is some dry skin but I do not appreciate a rash. IMAGING:  Imaging read by myself and interpreted as follows:      ASSESSMENT & PLAN  Diagnosis: Right knee osteoarthritis    Ronda montero continues to have symptomatic right knee osteoarthritis. She is still not interested in a surgery on the right knee in the form of a total knee replacement. She would like to try to get some pain relief and therefore a corticosteroid injection was offered. I also offered her a steroid pack for some of the soft tissue swelling but she said steroids make her crazy and she declined this. Follow-up as needed    [facility]  OFFICE PROCEDURE PROGRESS NOTE        Chart reviewed for the following:   Lyudmila Patrick MD, have reviewed the History, Physical and updated the Allergic reactions for [address] [facility] performed immediately prior to start of procedure:   Lyudmila Patrick MD, have performed the following reviews on Nallely Havers prior to the start of the procedure:            * Patient was identified by name and date of birth   * Agreement on procedure being performed was verified  * Risks and Benefits explained to the patient  * Procedure site verified and marked as necessary  * Patient was positioned for comfort  * Consent was signed and verified    Time: 11:42 AM    Location: Right knee joint intra-articular injection    Kenalog 40mg & 3cc Lidocaine    Date of procedure: 8/19/2022    Procedure performed by:  Pamela Kruger MD    Provider assisted by:  In Office MA    Patient assisted by: self    How tolerated by patient: tolerated the procedure well with no complications    Post Procedural Pain Scale: 0 - No Hurt    Comments: none      Electronically signed by: Tray Stanley MD    Note: This note was completed using voice recognition software.   Any typographical/name errors or mistakes are unintentional.

## 2022-11-24 ENCOUNTER — HOSPITAL ENCOUNTER (EMERGENCY)
Age: 72
Discharge: HOME | End: 2022-11-24
Attending: EMERGENCY MEDICINE
Payer: MEDICARE

## 2022-11-24 VITALS
TEMPERATURE: 98.4 F | HEIGHT: 63 IN | OXYGEN SATURATION: 98 % | SYSTOLIC BLOOD PRESSURE: 98 MMHG | DIASTOLIC BLOOD PRESSURE: 56 MMHG | WEIGHT: 160 LBS | HEART RATE: 84 BPM | BODY MASS INDEX: 28.35 KG/M2 | RESPIRATION RATE: 16 BRPM

## 2022-11-24 DIAGNOSIS — R10.84 ABDOMINAL PAIN, GENERALIZED: Primary | ICD-10-CM

## 2022-11-24 LAB
ALBUMIN SERPL-MCNC: 3.9 G/DL (ref 3.4–5)
ALBUMIN/GLOB SERPL: 1.4 {RATIO} (ref 0.8–1.7)
ALP SERPL-CCNC: 75 U/L (ref 45–117)
ALT SERPL-CCNC: 22 U/L (ref 13–56)
ANION GAP SERPL CALC-SCNC: 8 MMOL/L (ref 3–18)
AST SERPL-CCNC: 16 U/L (ref 10–38)
BASOPHILS # BLD: 0.1 K/UL (ref 0–0.1)
BASOPHILS NFR BLD: 1 % (ref 0–2)
BILIRUB SERPL-MCNC: 0.5 MG/DL (ref 0.2–1)
BUN SERPL-MCNC: 18 MG/DL (ref 7–18)
BUN/CREAT SERPL: 12 (ref 12–20)
CALCIUM SERPL-MCNC: 9.5 MG/DL (ref 8.5–10.1)
CHLORIDE SERPL-SCNC: 106 MMOL/L (ref 100–111)
CO2 SERPL-SCNC: 23 MMOL/L (ref 21–32)
CREAT SERPL-MCNC: 1.54 MG/DL (ref 0.6–1.3)
DIFFERENTIAL METHOD BLD: ABNORMAL
EOSINOPHIL # BLD: 0.1 K/UL (ref 0–0.4)
EOSINOPHIL NFR BLD: 2 % (ref 0–5)
ERYTHROCYTE [DISTWIDTH] IN BLOOD BY AUTOMATED COUNT: 13.3 % (ref 11.6–14.5)
GLOBULIN SER CALC-MCNC: 2.8 G/DL (ref 2–4)
GLUCOSE SERPL-MCNC: 90 MG/DL (ref 74–99)
HCT VFR BLD AUTO: 42 % (ref 35–45)
HGB BLD-MCNC: 14 G/DL (ref 12–16)
IMM GRANULOCYTES # BLD AUTO: 0 K/UL (ref 0–0.04)
IMM GRANULOCYTES NFR BLD AUTO: 0 % (ref 0–0.5)
LIPASE SERPL-CCNC: 183 U/L (ref 73–393)
LYMPHOCYTES # BLD: 1.2 K/UL (ref 0.9–3.6)
LYMPHOCYTES NFR BLD: 15 % (ref 21–52)
MAGNESIUM SERPL-MCNC: 2 MG/DL (ref 1.6–2.6)
MCH RBC QN AUTO: 28.7 PG (ref 24–34)
MCHC RBC AUTO-ENTMCNC: 33.3 G/DL (ref 31–37)
MCV RBC AUTO: 86.1 FL (ref 78–100)
MONOCYTES # BLD: 0.6 K/UL (ref 0.05–1.2)
MONOCYTES NFR BLD: 7 % (ref 3–10)
NEUTS SEG # BLD: 5.8 K/UL (ref 1.8–8)
NEUTS SEG NFR BLD: 75 % (ref 40–73)
NRBC # BLD: 0 K/UL (ref 0–0.01)
NRBC BLD-RTO: 0 PER 100 WBC
PLATELET # BLD AUTO: 237 K/UL (ref 135–420)
PMV BLD AUTO: 11.1 FL (ref 9.2–11.8)
POTASSIUM SERPL-SCNC: 4.1 MMOL/L (ref 3.5–5.5)
PROT SERPL-MCNC: 6.7 G/DL (ref 6.4–8.2)
RBC # BLD AUTO: 4.88 M/UL (ref 4.2–5.3)
SODIUM SERPL-SCNC: 137 MMOL/L (ref 136–145)
TROPONIN-HIGH SENSITIVITY: 12 NG/L (ref 0–54)
WBC # BLD AUTO: 7.7 K/UL (ref 4.6–13.2)

## 2022-11-24 PROCEDURE — 85025 COMPLETE CBC W/AUTO DIFF WBC: CPT

## 2022-11-24 PROCEDURE — 84484 ASSAY OF TROPONIN QUANT: CPT

## 2022-11-24 PROCEDURE — 83735 ASSAY OF MAGNESIUM: CPT

## 2022-11-24 PROCEDURE — 83690 ASSAY OF LIPASE: CPT

## 2022-11-24 PROCEDURE — 80053 COMPREHEN METABOLIC PANEL: CPT

## 2022-11-24 PROCEDURE — 93005 ELECTROCARDIOGRAM TRACING: CPT

## 2022-11-24 PROCEDURE — 99284 EMERGENCY DEPT VISIT MOD MDM: CPT

## 2022-11-24 NOTE — ED TRIAGE NOTES
Pt. Arrives to the ED via EMS endorsing feelings of dizziness, SOB, and abdominal pain that started last night.

## 2022-11-24 NOTE — ED PROVIDER NOTES
EMERGENCY DEPARTMENT HISTORY AND PHYSICAL EXAM    4:28 PM patient seen at this time in room 10      Date: 11/24/2022  Patient Name: Lavell Calvin    History of Presenting Illness     Chief Complaint   Patient presents with    Abdominal Pain    Shortness of Breath         History Provided By: patient    Additional History (Context): Lavell Calvin is a 67 y.o. female presents with history of colitis and chronic diarrhea, had normal Thanksgiving dinner room 12 today and then about 2:00 had severe pain, typical for her colitis episodes, nausea but no vomiting and lightheadedness. Pain is resolved with the nausea and the lightheadedness remain. She is concerned that she may have gotten behind on her fluids as well. Rowan Wolfe PCP: LUNA Minor    Chief Complaint:   Duration:    Timing:    Location:   Quality:   Severity:   Modifying Factors:   Associated Symptoms:       Current Facility-Administered Medications   Medication Dose Route Frequency Provider Last Rate Last Admin    sodium chloride 0.9 % bolus infusion 500 mL  500 mL IntraVENous ONCE Reji Huddleston MD         Current Outpatient Medications   Medication Sig Dispense Refill    pantoprazole (PROTONIX) 40 mg tablet TAKE 1 TABLET BY MOUTH DAILY 30 MINUTES BEFORE BREAKFAST      diclofenac (VOLTAREN) 1 % gel Apply 2 g to affected area four (4) times daily. 100 g 2    aspirin (ASPIRIN) 325 mg tablet Take 325 mg by mouth daily. lidocaine (XYLOCAINE) 4 % topical cream Apply  to affected area three (3) times daily as needed for Pain. 1 Tube 0    ibuprofen (MOTRIN) 600 mg tablet Take 1 Tab by mouth every eight (8) hours. 15 Tab 0    FLUoxetine (PROZAC) 20 mg capsule Take 20 mg by mouth daily. lisinopril (PRINIVIL, ZESTRIL) 5 mg tablet Take 5 mg by mouth daily. diazePAM (VALIUM) 5 mg tablet Take 5 mg by mouth every six (6) hours as needed for Anxiety.       temazepam (RESTORIL) 30 mg capsule Take 30 mg by mouth nightly as needed for Sleep. (Patient not taking: Reported on 3/7/2764)      folic acid (FOLVITE) 1 mg tablet Take  by mouth daily. lamoTRIgine (LAMICTAL) 100 mg tablet Take 100 mg by mouth daily. omeprazole (PRILOSEC) 40 mg capsule Take 40 mg by mouth daily. buPROPion SR (WELLBUTRIN SR) 150 mg SR tablet Take  by mouth two (2) times a day. Past History     Past Medical History:  Past Medical History:   Diagnosis Date    CC (collagenous colitis)     Chronic combined systolic and diastolic heart failure ([address] 2013    sob on exertion,class3     Endometriosis     Essential hypertension, benign     The hypertension is stable. UNABLE TO TOLERATE BETA BLOCKER, PT BACK ON LISINOPRIL 10 MG A DAY. Essential hypertension, benign     The hypertension is stable. UNABLE TO TOLERATE BETA BLOCKER, PT BACK ON LISINOPRIL 10 MG A DAY.      Hypertension     Hypothyroid     Mitral valve insufficiency and aortic valve insufficiency     Mild AI/mild MR, no symptoms    Mitral valve insufficiency and aortic valve insufficiency     Mild AI/mild MR, no symptoms     Neuropathy     Other primary cardiomyopathies 2013    ef decreased again to 35-40% unclear non ischemic etiology     Other specified cardiac dysrhythmias(427.89)     OCC PVC, one 4 beat SVT    Palpitations     10/12 recurrent post ablation of SVT in ; EKG with S Tach in 130s today; obtain results of recent labs from PCP, obtain results if recent Holter from Dr. Dona Downs    Paroxysmal supraventricular tachycardia [address]      ablation    Paroxysmal supraventricular tachycardia ([facility]      ablation     Psychiatric disorder        Past Surgical History:  Past Surgical History:   Procedure Laterality Date    HX AORTIC VALVE REPLACEMENT      HX BREAST REDUCTION      HX CARPAL TUNNEL RELEASE      HX  SECTION      x 2     HX CHOLECYSTECTOMY      HX DILATION AND CURETTAGE      HX HERNIA REPAIR      Incisional herniorrhaphy    HX ORTHOPAEDIC      right ankle and left foot    HX TONSILLECTOMY      [place] CARDIAC SURG PROCEDURE UNLIST      ablation; 9/12 for SVT - slow pathway; Dr. Tonny Hunt       Family History:  Family History   Problem Relation Age of Onset    Heart Attack Mother 52    Heart Attack Father 61    Heart Attack Other     Hypertension Other     Stroke Neg Hx     Sudden Death Neg Hx        Social History:  Social History     Tobacco Use    Smoking status: Never    Smokeless tobacco: Never   Vaping Use    Vaping Use: Never used   Substance Use Topics    Alcohol use: No    Drug use: No       Allergies: Allergies   Allergen Reactions    Latex Rash     PT DENIES    Neuromuscular Blockers, Steroidal Other (comments)     Hallucinations     Adhesive Rash    Augmentin [Amoxicillin-Pot Clavulanate] Diarrhea, Rash and Nausea and Vomiting    Coreg [Carvedilol] Diarrhea    Digoxin Diarrhea    Metoprolol Nausea and Vomiting    Toprol Xl [Metoprolol Succinate] Diarrhea     virtigo         Review of Systems     Review of Systems   Constitutional:  Negative for diaphoresis and fever. HENT:  Negative for congestion and sore throat. Eyes:  Negative for pain and itching. Respiratory:  Negative for cough and shortness of breath. Cardiovascular:  Negative for chest pain and palpitations. Gastrointestinal:  Positive for abdominal pain and diarrhea. Endocrine: Negative for polydipsia and polyuria. Genitourinary:  Negative for dysuria and hematuria. Musculoskeletal:  Negative for arthralgias and myalgias. Skin:  Negative for rash and wound. Neurological:  Negative for seizures and syncope. Hematological:  Does not bruise/bleed easily. Psychiatric/Behavioral:  Negative for agitation and hallucinations.         Physical Exam       Patient Vitals for the past 12 hrs:   Temp Pulse Resp BP SpO2   11/24/22 1726 -- 84 16 (!) 98/56 --   11/24/22 1540 98.4 °F (36.9 °C) 93 22 108/68 98 %       IPVITALS  Patient Vitals for the past 24 hrs:   BP Temp Pulse Resp SpO2 Height Weight   11/24/22 1726 (!) 98/56 -- 84 16 -- -- --   11/24/22 1540 108/68 98.4 °F (36.9 °C) 93 22 98 % 5' 3\" (1.6 m) 72.6 kg (160 lb)       Physical Exam  Vitals and nursing note reviewed. Constitutional:       General: She is not in acute distress. Appearance: She is well-developed. She is not ill-appearing. HENT:      Head: Normocephalic and atraumatic. Eyes:      General: No scleral icterus. Conjunctiva/sclera: Conjunctivae normal.   Neck:      Vascular: No JVD. Cardiovascular:      Rate and Rhythm: Normal rate and regular rhythm. Heart sounds: Normal heart sounds. Comments: 4 intact extremity pulses  Pulmonary:      Effort: Pulmonary effort is normal.      Breath sounds: Normal breath sounds. Abdominal:      Palpations: Abdomen is soft. There is no mass. Tenderness: There is no abdominal tenderness. Musculoskeletal:         General: Normal range of motion. Cervical back: Normal range of motion and neck supple. Lymphadenopathy:      Cervical: No cervical adenopathy. Skin:     General: Skin is warm and dry. Neurological:      Mental Status: She is alert. Diagnostic Study Results   Labs -  Recent Results (from the past 24 hour(s))   CBC WITH AUTOMATED DIFF    Collection Time: 11/24/22  3:52 PM   Result Value Ref Range    WBC 7.7 4.6 - 13.2 K/uL    RBC 4.88 4.20 - 5.30 M/uL    HGB 14.0 12.0 - 16.0 g/dL    HCT 42.0 35.0 - 45.0 %    MCV 86.1 78.0 - 100.0 FL    MCH 28.7 24.0 - 34.0 PG    MCHC 33.3 31.0 - 37.0 g/dL    RDW 13.3 11.6 - 14.5 %    PLATELET 276 195 - 180 K/uL    MPV 11.1 9.2 - 11.8 FL    NRBC 0.0 0  WBC    ABSOLUTE NRBC 0.00 0.00 - 0.01 K/uL    NEUTROPHILS 75 (H) 40 - 73 %    LYMPHOCYTES 15 (L) 21 - 52 %    MONOCYTES 7 3 - 10 %    EOSINOPHILS 2 0 - 5 %    BASOPHILS 1 0 - 2 %    IMMATURE GRANULOCYTES 0 0.0 - 0.5 %    ABS. NEUTROPHILS 5.8 1.8 - 8.0 K/UL    ABS. LYMPHOCYTES 1.2 0.9 - 3.6 K/UL    ABS. MONOCYTES 0.6 0.05 - 1.2 K/UL    ABS. EOSINOPHILS 0.1 0.0 - 0.4 K/UL    ABS. BASOPHILS 0.1 0.0 - 0.1 K/UL    ABS. IMM. GRANS. 0.0 0.00 - 0.04 K/UL    DF AUTOMATED     METABOLIC PANEL, COMPREHENSIVE    Collection Time: 11/24/22  3:52 PM   Result Value Ref Range    Sodium 137 136 - 145 mmol/L    Potassium 4.1 3.5 - 5.5 mmol/L    Chloride 106 100 - 111 mmol/L    CO2 23 21 - 32 mmol/L    Anion gap 8 3.0 - 18 mmol/L    Glucose 90 74 - 99 mg/dL    BUN 18 7.0 - 18 MG/DL    Creatinine 1.54 (H) 0.6 - 1.3 MG/DL    BUN/Creatinine ratio 12 12 - 20      eGFR 36 (L) >60 ml/min/1.73m2    Calcium 9.5 8.5 - 10.1 MG/DL    Bilirubin, total 0.5 0.2 - 1.0 MG/DL    ALT (SGPT) 22 13 - 56 U/L    AST (SGOT) 16 10 - 38 U/L    Alk. phosphatase 75 45 - 117 U/L    Protein, total 6.7 6.4 - 8.2 g/dL    Albumin 3.9 3.4 - 5.0 g/dL    Globulin 2.8 2.0 - 4.0 g/dL    A-G Ratio 1.4 0.8 - 1.7     TROPONIN-HIGH SENSITIVITY    Collection Time: 11/24/22  3:52 PM   Result Value Ref Range    Troponin-High Sensitivity 12 0 - 54 ng/L   MAGNESIUM    Collection Time: 11/24/22  3:52 PM   Result Value Ref Range    Magnesium 2.0 1.6 - 2.6 mg/dL   LIPASE    Collection Time: 11/24/22  3:52 PM   Result Value Ref Range    Lipase 183 73 - 393 U/L       Radiologic Studies -   No orders to display     No results found. Medications ordered:   Medications   sodium chloride 0.9 % bolus infusion 500 mL (has no administration in time range)         Medical Decision Making   Initial Medical Decision Making and DDx:  Low risk of MI will check troponin and EKG. Check electrolytes for signs of dehydration, give some fluids. No pain at this time not suspicious for bowel obstruction perforation acute cholecystitis or other alarming intra-abdominal pathology    ED Course: Progress Notes, Reevaluation, and Consults:     5:52 PM Pt reevaluated at this time. Discussed results and findings, as well as, diagnosis and plan for discharge. Follow up with doctors/services listed.   Return to the emergency department for alarming symptoms. Pt verbalizes understanding and agreement with plan. All questions addressed. Negative troponin nonactionable twelve-lead EKG, patient asking to be discharged thinks this is an episode of colitis her shortness of breath is resolved no further pain. Will discharge she will return for any recurrence of her symptoms. Twelve-lead EKG sinus rhythm with arrhythmia narrow complex at a rate of 88, inverted T waves in 1 and aVL not changed from prior    I am the first provider for this patient. I reviewed the vital signs, available nursing notes, past medical history, past surgical history, family history and social history. Patient Vitals for the past 12 hrs:   Temp Pulse Resp BP SpO2   11/24/22 1726 -- 84 16 (!) 98/56 --   11/24/22 1540 98.4 °F (36.9 °C) 93 22 108/68 98 %       Vital Signs-Reviewed the patient's vital signs. Pulse Oximetry Analysis, Cardiac Monitor, 12 lead ekg:      Interpreted by the EP. Records Reviewed: Nursing notes reviewed (Time of Review: 4:28 PM)    Procedures:   Critical Care Time:   Aspirin: (was aspirin given for stroke?)    Diagnosis     Clinical Impression:   1. Abdominal pain, generalized        Disposition: Discharged      Follow-up Information       Follow up With Specialties Details Why Contact Info    LUNA Campos Physician Assistant In 3 days  [address] [address] [place], [place] [id number]               Patient's Medications   Start Taking    No medications on file   Continue Taking    ASPIRIN (ASPIRIN) 325 MG TABLET    Take 325 mg by mouth daily. BUPROPION SR (WELLBUTRIN SR) 150 MG SR TABLET    Take  by mouth two (2) times a day. DIAZEPAM (VALIUM) 5 MG TABLET    Take 5 mg by mouth every six (6) hours as needed for Anxiety. DICLOFENAC (VOLTAREN) 1 % GEL    Apply 2 g to affected area four (4) times daily. FLUOXETINE (PROZAC) 20 MG CAPSULE    Take 20 mg by mouth daily.     FOLIC ACID (FOLVITE) 1 MG TABLET Take  by mouth daily. IBUPROFEN (MOTRIN) 600 MG TABLET    Take 1 Tab by mouth every eight (8) hours. LAMOTRIGINE (LAMICTAL) 100 MG TABLET    Take 100 mg by mouth daily. LIDOCAINE (XYLOCAINE) 4 % TOPICAL CREAM    Apply  to affected area three (3) times daily as needed for Pain. LISINOPRIL (PRINIVIL, ZESTRIL) 5 MG TABLET    Take 5 mg by mouth daily. OMEPRAZOLE (PRILOSEC) 40 MG CAPSULE    Take 40 mg by mouth daily. PANTOPRAZOLE (PROTONIX) 40 MG TABLET    TAKE 1 TABLET BY MOUTH DAILY 30 MINUTES BEFORE BREAKFAST    TEMAZEPAM (RESTORIL) 30 MG CAPSULE    Take 30 mg by mouth nightly as needed for Sleep.    These Medications have changed    No medications on file   Stop Taking    No medications on file     _______________________________    Notes:    James Moctezuma MD using Dragon dictation      _______________________________

## 2022-11-25 LAB
ATRIAL RATE: 88 BPM
CALCULATED P AXIS, ECG09: 76 DEGREES
CALCULATED R AXIS, ECG10: -6 DEGREES
CALCULATED T AXIS, ECG11: 98 DEGREES
DIAGNOSIS, 93000: NORMAL
P-R INTERVAL, ECG05: 164 MS
Q-T INTERVAL, ECG07: 446 MS
QRS DURATION, ECG06: 90 MS
QTC CALCULATION (BEZET), ECG08: 539 MS
VENTRICULAR RATE, ECG03: 88 BPM

## 2023-08-13 ENCOUNTER — APPOINTMENT (OUTPATIENT)
Facility: HOSPITAL | Age: 73
End: 2023-08-13
Payer: MEDICARE

## 2023-08-13 ENCOUNTER — HOSPITAL ENCOUNTER (OUTPATIENT)
Facility: HOSPITAL | Age: 73
Setting detail: OBSERVATION
Discharge: HOME | End: 2023-08-15
Attending: EMERGENCY MEDICINE | Admitting: STUDENT IN AN ORGANIZED HEALTH CARE EDUCATION/TRAINING PROGRAM
Payer: MEDICARE

## 2023-08-13 DIAGNOSIS — R55 NEAR SYNCOPE: Primary | ICD-10-CM

## 2023-08-13 DIAGNOSIS — R55 PRE-SYNCOPE: ICD-10-CM

## 2023-08-13 DIAGNOSIS — I08.0 MITRAL VALVE INSUFFICIENCY AND AORTIC VALVE INSUFFICIENCY: ICD-10-CM

## 2023-08-13 DIAGNOSIS — K76.89 HEPATIC CYST: ICD-10-CM

## 2023-08-13 DIAGNOSIS — R94.31 PROLONGED Q-T INTERVAL ON ECG: ICD-10-CM

## 2023-08-13 DIAGNOSIS — N28.1 RENAL CYST: ICD-10-CM

## 2023-08-13 DIAGNOSIS — R55 SYNCOPE, UNSPECIFIED SYNCOPE TYPE: ICD-10-CM

## 2023-08-13 DIAGNOSIS — I15.8 OTHER SECONDARY HYPERTENSION: ICD-10-CM

## 2023-08-13 DIAGNOSIS — Z86.79 HISTORY OF HEART FAILURE: ICD-10-CM

## 2023-08-13 DIAGNOSIS — E04.1 THYROID NODULE: ICD-10-CM

## 2023-08-13 LAB
ALBUMIN SERPL-MCNC: 3.4 G/DL (ref 3.4–5)
ALBUMIN/GLOB SERPL: 1.2 (ref 0.8–1.7)
ALP SERPL-CCNC: 66 U/L (ref 45–117)
ALT SERPL-CCNC: 18 U/L (ref 13–56)
ANION GAP SERPL CALC-SCNC: 7 MMOL/L (ref 3–18)
APPEARANCE UR: CLEAR
AST SERPL-CCNC: 15 U/L (ref 10–38)
BASOPHILS # BLD: 0 K/UL (ref 0–0.1)
BASOPHILS NFR BLD: 1 % (ref 0–2)
BILIRUB SERPL-MCNC: 0.3 MG/DL (ref 0.2–1)
BILIRUB UR QL: NEGATIVE
BUN SERPL-MCNC: 19 MG/DL (ref 7–18)
BUN/CREAT SERPL: 16 (ref 12–20)
CALCIUM SERPL-MCNC: 8.6 MG/DL (ref 8.5–10.1)
CHLORIDE SERPL-SCNC: 110 MMOL/L (ref 100–111)
CO2 SERPL-SCNC: 23 MMOL/L (ref 21–32)
COLOR UR: YELLOW
CREAT SERPL-MCNC: 1.18 MG/DL (ref 0.6–1.3)
DIFFERENTIAL METHOD BLD: ABNORMAL
EKG ATRIAL RATE: 91 BPM
EKG DIAGNOSIS: NORMAL
EKG P AXIS: 73 DEGREES
EKG P-R INTERVAL: 176 MS
EKG Q-T INTERVAL: 416 MS
EKG QRS DURATION: 100 MS
EKG QTC CALCULATION (BAZETT): 511 MS
EKG R AXIS: -3 DEGREES
EKG T AXIS: 80 DEGREES
EKG VENTRICULAR RATE: 91 BPM
EOSINOPHIL # BLD: 0.1 K/UL (ref 0–0.4)
EOSINOPHIL NFR BLD: 2 % (ref 0–5)
ERYTHROCYTE [DISTWIDTH] IN BLOOD BY AUTOMATED COUNT: 13.9 % (ref 11.6–14.5)
GLOBULIN SER CALC-MCNC: 2.8 G/DL (ref 2–4)
GLUCOSE SERPL-MCNC: 147 MG/DL (ref 74–99)
GLUCOSE UR STRIP.AUTO-MCNC: NEGATIVE MG/DL
HCT VFR BLD AUTO: 34.5 % (ref 35–45)
HGB BLD-MCNC: 10.9 G/DL (ref 12–16)
HGB UR QL STRIP: NEGATIVE
IMM GRANULOCYTES # BLD AUTO: 0 K/UL (ref 0–0.04)
IMM GRANULOCYTES NFR BLD AUTO: 0 % (ref 0–0.5)
INR PPP: 1 (ref 0.9–1.1)
KETONES UR QL STRIP.AUTO: NEGATIVE MG/DL
LEUKOCYTE ESTERASE UR QL STRIP.AUTO: NEGATIVE
LYMPHOCYTES # BLD: 0.7 K/UL (ref 0.9–3.6)
LYMPHOCYTES NFR BLD: 12 % (ref 21–52)
MAGNESIUM SERPL-MCNC: 1.6 MG/DL (ref 1.6–2.6)
MCH RBC QN AUTO: 26.1 PG (ref 24–34)
MCHC RBC AUTO-ENTMCNC: 31.6 G/DL (ref 31–37)
MCV RBC AUTO: 82.7 FL (ref 78–100)
MONOCYTES # BLD: 0.4 K/UL (ref 0.05–1.2)
MONOCYTES NFR BLD: 8 % (ref 3–10)
NEUTS SEG # BLD: 4.3 K/UL (ref 1.8–8)
NEUTS SEG NFR BLD: 77 % (ref 40–73)
NITRITE UR QL STRIP.AUTO: NEGATIVE
NRBC # BLD: 0 K/UL (ref 0–0.01)
NRBC BLD-RTO: 0 PER 100 WBC
PH UR STRIP: 6.5 (ref 5–8)
PLATELET # BLD AUTO: 183 K/UL (ref 135–420)
PMV BLD AUTO: 11.6 FL (ref 9.2–11.8)
POTASSIUM SERPL-SCNC: 3.6 MMOL/L (ref 3.5–5.5)
PROT SERPL-MCNC: 6.2 G/DL (ref 6.4–8.2)
PROT UR STRIP-MCNC: NEGATIVE MG/DL
PROTHROMBIN TIME: 13.7 SEC (ref 11.9–14.7)
RBC # BLD AUTO: 4.17 M/UL (ref 4.2–5.3)
SODIUM SERPL-SCNC: 140 MMOL/L (ref 136–145)
SP GR UR REFRACTOMETRY: >1.03 (ref 1–1.03)
TROPONIN I SERPL HS-MCNC: 12 NG/L (ref 0–54)
TROPONIN I SERPL HS-MCNC: 14 NG/L (ref 0–54)
UROBILINOGEN UR QL STRIP.AUTO: 0.2 EU/DL (ref 0.2–1)
WBC # BLD AUTO: 5.5 K/UL (ref 4.6–13.2)

## 2023-08-13 PROCEDURE — 93005 ELECTROCARDIOGRAM TRACING: CPT | Performed by: EMERGENCY MEDICINE

## 2023-08-13 PROCEDURE — G0378 HOSPITAL OBSERVATION PER HR: HCPCS

## 2023-08-13 PROCEDURE — 6370000000 HC RX 637 (ALT 250 FOR IP): Performed by: STUDENT IN AN ORGANIZED HEALTH CARE EDUCATION/TRAINING PROGRAM

## 2023-08-13 PROCEDURE — 2580000003 HC RX 258: Performed by: STUDENT IN AN ORGANIZED HEALTH CARE EDUCATION/TRAINING PROGRAM

## 2023-08-13 PROCEDURE — 85025 COMPLETE CBC W/AUTO DIFF WBC: CPT

## 2023-08-13 PROCEDURE — 80053 COMPREHEN METABOLIC PANEL: CPT

## 2023-08-13 PROCEDURE — 6360000002 HC RX W HCPCS: Performed by: STUDENT IN AN ORGANIZED HEALTH CARE EDUCATION/TRAINING PROGRAM

## 2023-08-13 PROCEDURE — 84484 ASSAY OF TROPONIN QUANT: CPT

## 2023-08-13 PROCEDURE — 71275 CT ANGIOGRAPHY CHEST: CPT

## 2023-08-13 PROCEDURE — 83735 ASSAY OF MAGNESIUM: CPT

## 2023-08-13 PROCEDURE — 6360000004 HC RX CONTRAST MEDICATION: Performed by: STUDENT IN AN ORGANIZED HEALTH CARE EDUCATION/TRAINING PROGRAM

## 2023-08-13 PROCEDURE — 93010 ELECTROCARDIOGRAM REPORT: CPT | Performed by: INTERNAL MEDICINE

## 2023-08-13 PROCEDURE — 96366 THER/PROPH/DIAG IV INF ADDON: CPT

## 2023-08-13 PROCEDURE — 81003 URINALYSIS AUTO W/O SCOPE: CPT

## 2023-08-13 PROCEDURE — 85610 PROTHROMBIN TIME: CPT

## 2023-08-13 PROCEDURE — 96365 THER/PROPH/DIAG IV INF INIT: CPT

## 2023-08-13 PROCEDURE — 99285 EMERGENCY DEPT VISIT HI MDM: CPT

## 2023-08-13 PROCEDURE — 96372 THER/PROPH/DIAG INJ SC/IM: CPT

## 2023-08-13 RX ORDER — LISINOPRIL 2.5 MG/1
5 TABLET ORAL DAILY
Status: DISCONTINUED | OUTPATIENT
Start: 2023-08-13 | End: 2023-08-15 | Stop reason: HOSPADM

## 2023-08-13 RX ORDER — ONDANSETRON 2 MG/ML
4 INJECTION INTRAMUSCULAR; INTRAVENOUS EVERY 6 HOURS PRN
Status: DISCONTINUED | OUTPATIENT
Start: 2023-08-13 | End: 2023-08-15 | Stop reason: HOSPADM

## 2023-08-13 RX ORDER — ACETAMINOPHEN 650 MG/1
650 SUPPOSITORY RECTAL EVERY 6 HOURS PRN
Status: DISCONTINUED | OUTPATIENT
Start: 2023-08-13 | End: 2023-08-15 | Stop reason: HOSPADM

## 2023-08-13 RX ORDER — BUPROPION HYDROCHLORIDE 100 MG/1
100 TABLET ORAL DAILY
Status: DISCONTINUED | OUTPATIENT
Start: 2023-08-14 | End: 2023-08-15 | Stop reason: HOSPADM

## 2023-08-13 RX ORDER — SODIUM CHLORIDE 0.9 % (FLUSH) 0.9 %
5-40 SYRINGE (ML) INJECTION EVERY 12 HOURS SCHEDULED
Status: DISCONTINUED | OUTPATIENT
Start: 2023-08-13 | End: 2023-08-15 | Stop reason: HOSPADM

## 2023-08-13 RX ORDER — SODIUM CHLORIDE 0.9 % (FLUSH) 0.9 %
5-40 SYRINGE (ML) INJECTION PRN
Status: DISCONTINUED | OUTPATIENT
Start: 2023-08-13 | End: 2023-08-15 | Stop reason: HOSPADM

## 2023-08-13 RX ORDER — MAGNESIUM SULFATE IN WATER 40 MG/ML
2000 INJECTION, SOLUTION INTRAVENOUS ONCE
Status: COMPLETED | OUTPATIENT
Start: 2023-08-13 | End: 2023-08-13

## 2023-08-13 RX ORDER — ENOXAPARIN SODIUM 100 MG/ML
40 INJECTION SUBCUTANEOUS DAILY
Status: DISCONTINUED | OUTPATIENT
Start: 2023-08-13 | End: 2023-08-15 | Stop reason: HOSPADM

## 2023-08-13 RX ORDER — POLYETHYLENE GLYCOL 3350 17 G/17G
17 POWDER, FOR SOLUTION ORAL DAILY PRN
Status: DISCONTINUED | OUTPATIENT
Start: 2023-08-13 | End: 2023-08-15 | Stop reason: HOSPADM

## 2023-08-13 RX ORDER — PANTOPRAZOLE SODIUM 40 MG/1
40 TABLET, DELAYED RELEASE ORAL
Status: DISCONTINUED | OUTPATIENT
Start: 2023-08-14 | End: 2023-08-15 | Stop reason: HOSPADM

## 2023-08-13 RX ORDER — ACETAMINOPHEN 325 MG/1
650 TABLET ORAL EVERY 6 HOURS PRN
Status: DISCONTINUED | OUTPATIENT
Start: 2023-08-13 | End: 2023-08-15 | Stop reason: HOSPADM

## 2023-08-13 RX ORDER — LAMOTRIGINE 100 MG/1
100 TABLET ORAL DAILY
Status: DISCONTINUED | OUTPATIENT
Start: 2023-08-13 | End: 2023-08-15 | Stop reason: HOSPADM

## 2023-08-13 RX ORDER — FLUOXETINE HYDROCHLORIDE 20 MG/1
20 CAPSULE ORAL DAILY
Status: DISCONTINUED | OUTPATIENT
Start: 2023-08-13 | End: 2023-08-15

## 2023-08-13 RX ORDER — SODIUM CHLORIDE 9 MG/ML
INJECTION, SOLUTION INTRAVENOUS PRN
Status: DISCONTINUED | OUTPATIENT
Start: 2023-08-13 | End: 2023-08-15 | Stop reason: HOSPADM

## 2023-08-13 RX ADMIN — FLUOXETINE 20 MG: 20 CAPSULE ORAL at 21:33

## 2023-08-13 RX ADMIN — SODIUM CHLORIDE, PRESERVATIVE FREE 10 ML: 5 INJECTION INTRAVENOUS at 23:04

## 2023-08-13 RX ADMIN — IOPAMIDOL 80 ML: 755 INJECTION, SOLUTION INTRAVENOUS at 17:42

## 2023-08-13 RX ADMIN — LAMOTRIGINE 100 MG: 100 TABLET ORAL at 21:33

## 2023-08-13 RX ADMIN — LISINOPRIL 5 MG: 2.5 TABLET ORAL at 21:33

## 2023-08-13 RX ADMIN — ENOXAPARIN SODIUM 40 MG: 100 INJECTION SUBCUTANEOUS at 21:33

## 2023-08-13 RX ADMIN — MAGNESIUM SULFATE HEPTAHYDRATE 2000 MG: 40 INJECTION, SOLUTION INTRAVENOUS at 19:52

## 2023-08-13 ASSESSMENT — ENCOUNTER SYMPTOMS
ABDOMINAL PAIN: 0
COUGH: 0
NAUSEA: 0
BLOOD IN STOOL: 0
VOMITING: 0
DIARRHEA: 0
CHEST TIGHTNESS: 0
SHORTNESS OF BREATH: 0

## 2023-08-13 NOTE — ED PROVIDER NOTES
[facility] EMERGENCY DEPT  EMERGENCY DEPARTMENT ENCOUNTER      Pt Name: Galo Vargas  MRN: [id number]  [address] 1950  Date of evaluation: 8/13/2023  Provider: Estefany Holt DO    CHIEF COMPLAINT       Chief Complaint   Patient presents with    Shortness of Breath    Loss of Consciousness         HISTORY OF PRESENT ILLNESS   (Location/Symptom, Timing/Onset, Context/Setting, Quality, Duration, Modifying Factors, Severity)  Note limiting factors. Galo Vargas is a 68 y.o. female who presents to the emergency department      68-year-old female history of 3 heart valve replacements 9 years ago, heart failure, high blood pressure, paroxysmal SVT presenting today stating that she had an episode of syncope that lasted less than 5 seconds, states that she was standing at the register in a store holding onto the counter when she suddenly lost consciousness. States that she did not fall, states that she came to while still holding onto the counter and she sat down on a chair that was brought close to her. States that she did not have any preceding symptoms prior to losing consciousness, states that she has not had this at before, states that she had no chest pain or shortness of breath at the time of the event and no palpitations and states she has none of the same currently. States that she has not had any recent trauma falls headaches recent illnesses or fevers or abdominal pain. Nursing Notes were reviewed. REVIEW OF SYSTEMS    (2-9 systems for level 4, 10 or more for level 5)     Review of Systems   Constitutional:  Negative for chills and fever. Eyes:  Negative for visual disturbance. Respiratory:  Negative for cough, chest tightness and shortness of breath. Cardiovascular:  Negative for chest pain. Gastrointestinal:  Negative for abdominal pain, blood in stool, diarrhea, nausea and vomiting. Genitourinary:  Negative for dysuria, flank pain, hematuria and urgency. BIOPSY W LOC DEVICE 1ST LESION LEFT Left 5/4/2021    US BREAST NEEDLE BIOPSY LEFT 5/4/2021 [facility] RAD US         CURRENT MEDICATIONS       Previous Medications    ASPIRIN 325 MG TABLET    Take 325 mg by mouth daily    BUPROPION (WELLBUTRIN SR) 150 MG EXTENDED RELEASE TABLET    Take by mouth 2 times daily    DIAZEPAM (VALIUM) 5 MG TABLET    Take 5 mg by mouth every 6 hours as needed. DICLOFENAC SODIUM (VOLTAREN) 1 % GEL    Apply 2 g topically 4 times daily    FLUOXETINE (PROZAC) 20 MG CAPSULE    Take 20 mg by mouth daily    FOLIC ACID (FOLVITE) 1 MG TABLET    Take by mouth daily    IBUPROFEN (ADVIL;MOTRIN) 600 MG TABLET    Take 600 mg by mouth in the morning and 600 mg at noon and 600 mg in the evening. LAMOTRIGINE (LAMICTAL) 100 MG TABLET    Take 100 mg by mouth daily    LIDOCAINE HCL 4 % CREA    Apply topically 3 times daily as needed    LISINOPRIL (PRINIVIL;ZESTRIL) 5 MG TABLET    Take 5 mg by mouth daily    OMEPRAZOLE (PRILOSEC) 40 MG DELAYED RELEASE CAPSULE    Take 40 mg by mouth daily    PANTOPRAZOLE (PROTONIX) 40 MG TABLET    TAKE 1 TABLET BY MOUTH DAILY 30 MINUTES BEFORE BREAKFAST    TEMAZEPAM (RESTORIL) 30 MG CAPSULE    Take 30 mg by mouth.        ALLERGIES     Latex, Carvedilol, Digoxin, Adhesive tape, Amoxicillin-pot clavulanate, and Metoprolol    FAMILY HISTORY       Family History   Problem Relation Age of Onset    Heart Attack Mother 52    Heart Attack Father 61    Heart Attack Other     Sudden Death Neg Hx     Stroke Neg Hx     Hypertension Other           SOCIAL HISTORY       Social History     Socioeconomic History    Marital status:      Spouse name: None    Number of children: None    Years of education: None    Highest education level: None   Tobacco Use    Smoking status: Never    Smokeless tobacco: Never   Substance and Sexual Activity    Alcohol use: No    Drug use: No       SCREENINGS         [place] Coma Scale  Eye Opening: Spontaneous  Best Verbal Response: Oriented  Best Motor

## 2023-08-13 NOTE — ED TRIAGE NOTES
Pt arrives to ed via ems for sybncopal episode and sob. Pt was in a store and had syncoplal episode, states she felt overheat. Pt had episode of N/V prior to arrival denies any at this time. Denies CP. Past Medical History:   Diagnosis Date    CC (collagenous colitis)     Chronic combined systolic and diastolic heart failure ([address]) 7/18/2013    sob on exertion,class3     Endometriosis     Essential hypertension, benign     The hypertension is stable. UNABLE TO TOLERATE BETA BLOCKER, PT BACK ON LISINOPRIL 10 MG A DAY. Essential hypertension, benign     The hypertension is stable. UNABLE TO TOLERATE BETA BLOCKER, PT BACK ON LISINOPRIL 10 MG A DAY.     Hypertension     Hypothyroid     Mitral valve insufficiency and aortic valve insufficiency     Mild AI/mild MR, no symptoms    Mitral valve insufficiency and aortic valve insufficiency     Mild AI/mild MR, no symptoms     Neuropathy     Other primary cardiomyopathies 7/18/2013    ef decreased again to 35-40% unclear non ischemic etiology     Other specified cardiac dysrhythmias(427.89)     OCC PVC, one 4 beat SVT    Palpitations     10/12 recurrent post ablation of SVT in 9/12; EKG with S Tach in 130s today; obtain results of recent labs from PCP, obtain results if recent Holter from Dr. Valentin Walker    Paroxysmal supraventricular tachycardia ([address])     9/12 ablation     Paroxysmal supraventricular tachycardia ([address])     9/12 ablation    Psychiatric disorder

## 2023-08-13 NOTE — ED PROVIDER NOTES
6:12 PM :Pt care assumed from Dr. Flo Beltre , ED provider. Pt complaint(s), current treatment plan, progression and available diagnostic results have been discussed thoroughly. The patient was seen and evaluated on my shift.    Rounding occurred: Yes  Intended Disposition: Admit   Pending diagnostic reports and/or labs (please list): near syncope, awaiting CTA chest        Namratakip Choi MD  08/13/23 2196

## 2023-08-14 ENCOUNTER — APPOINTMENT (OUTPATIENT)
Facility: HOSPITAL | Age: 73
End: 2023-08-14
Attending: STUDENT IN AN ORGANIZED HEALTH CARE EDUCATION/TRAINING PROGRAM
Payer: MEDICARE

## 2023-08-14 ENCOUNTER — APPOINTMENT (OUTPATIENT)
Facility: HOSPITAL | Age: 73
End: 2023-08-14
Payer: MEDICARE

## 2023-08-14 PROBLEM — Z86.79 HISTORY OF HEART FAILURE: Status: ACTIVE | Noted: 2023-08-14

## 2023-08-14 PROBLEM — R94.31 PROLONGED Q-T INTERVAL ON ECG: Status: ACTIVE | Noted: 2023-08-14

## 2023-08-14 LAB
ANION GAP SERPL CALC-SCNC: 5 MMOL/L (ref 3–18)
BASOPHILS # BLD: 0.1 K/UL (ref 0–0.1)
BASOPHILS NFR BLD: 1 % (ref 0–2)
BUN SERPL-MCNC: 14 MG/DL (ref 7–18)
BUN/CREAT SERPL: 13 (ref 12–20)
CALCIUM SERPL-MCNC: 8.4 MG/DL (ref 8.5–10.1)
CHLORIDE SERPL-SCNC: 109 MMOL/L (ref 100–111)
CHOLEST SERPL-MCNC: 224 MG/DL
CO2 SERPL-SCNC: 26 MMOL/L (ref 21–32)
CREAT SERPL-MCNC: 1.07 MG/DL (ref 0.6–1.3)
DIFFERENTIAL METHOD BLD: ABNORMAL
ECHO AO ROOT DIAM: 2.7 CM
ECHO AO ROOT INDEX: 1.52 CM/M2
ECHO AV MEAN GRADIENT: 19 MMHG
ECHO AV MEAN VELOCITY: 2.1 M/S
ECHO AV PEAK GRADIENT: 32 MMHG
ECHO AV PEAK VELOCITY: 2.8 M/S
ECHO AV VELOCITY RATIO: 0.21
ECHO AV VTI: 61.5 CM
ECHO BSA: 1.82 M2
ECHO BSA: 1.82 M2
ECHO EST RA PRESSURE: 3 MMHG
ECHO LA VOL 2C: 86 ML (ref 22–52)
ECHO LA VOL 4C: 74 ML (ref 22–52)
ECHO LA VOL BP: 76 ML (ref 22–52)
ECHO LA VOL/BSA BIPLANE: 43 ML/M2 (ref 16–34)
ECHO LA VOLUME AREA LENGTH: 80 ML
ECHO LA VOLUME INDEX A2C: 48 ML/M2 (ref 16–34)
ECHO LA VOLUME INDEX A4C: 42 ML/M2 (ref 16–34)
ECHO LA VOLUME INDEX AREA LENGTH: 45 ML/M2 (ref 16–34)
ECHO LV EJECTION FRACTION A2C: 47 %
ECHO LV EJECTION FRACTION A4C: 35 %
ECHO LV FRACTIONAL SHORTENING: 22 % (ref 28–44)
ECHO LV INTERNAL DIMENSION DIASTOLE INDEX: 3.09 CM/M2
ECHO LV INTERNAL DIMENSION DIASTOLIC: 5.5 CM (ref 3.9–5.3)
ECHO LV INTERNAL DIMENSION SYSTOLIC INDEX: 2.42 CM/M2
ECHO LV INTERNAL DIMENSION SYSTOLIC: 4.3 CM
ECHO LV IVSD: 0.8 CM (ref 0.6–0.9)
ECHO LV MASS 2D: 172.7 G (ref 67–162)
ECHO LV MASS INDEX 2D: 97 G/M2 (ref 43–95)
ECHO LV POSTERIOR WALL DIASTOLIC: 0.9 CM (ref 0.6–0.9)
ECHO LV RELATIVE WALL THICKNESS RATIO: 0.33
ECHO LVOT AREA: 3.1 CM2
ECHO LVOT DIAM: 2 CM
ECHO LVOT MEAN GRADIENT: 1 MMHG
ECHO LVOT PEAK GRADIENT: 1 MMHG
ECHO LVOT PEAK VELOCITY: 0.6 M/S
ECHO MV MAX VELOCITY: 1.7 M/S
ECHO MV MEAN GRADIENT: 5 MMHG
ECHO MV MEAN VELOCITY: 1 M/S
ECHO MV PEAK GRADIENT: 12 MMHG
ECHO MV VTI: 34.9 CM
ECHO RIGHT VENTRICULAR SYSTOLIC PRESSURE (RVSP): 33 MMHG
ECHO RV BASAL DIMENSION: 4.3 CM
ECHO RV FREE WALL PEAK S': 6 CM/S
ECHO RV TAPSE: 1.2 CM (ref 1.7–?)
ECHO TV MEAN GRADIENT: 1 MMHG
ECHO TV REGURGITANT MAX VELOCITY: 2.76 M/S
ECHO TV REGURGITANT PEAK GRADIENT: 31 MMHG
EKG ATRIAL RATE: 81 BPM
EKG ATRIAL RATE: 95 BPM
EKG DIAGNOSIS: NORMAL
EKG DIAGNOSIS: NORMAL
EKG P AXIS: 62 DEGREES
EKG P AXIS: 63 DEGREES
EKG P-R INTERVAL: 178 MS
EKG P-R INTERVAL: 192 MS
EKG Q-T INTERVAL: 422 MS
EKG Q-T INTERVAL: 452 MS
EKG QRS DURATION: 102 MS
EKG QRS DURATION: 110 MS
EKG QTC CALCULATION (BAZETT): 525 MS
EKG QTC CALCULATION (BAZETT): 530 MS
EKG R AXIS: -1 DEGREES
EKG R AXIS: -10 DEGREES
EKG T AXIS: 87 DEGREES
EKG T AXIS: 94 DEGREES
EKG VENTRICULAR RATE: 81 BPM
EKG VENTRICULAR RATE: 95 BPM
EOSINOPHIL # BLD: 0.2 K/UL (ref 0–0.4)
EOSINOPHIL NFR BLD: 2 % (ref 0–5)
ERYTHROCYTE [DISTWIDTH] IN BLOOD BY AUTOMATED COUNT: 14 % (ref 11.6–14.5)
GLUCOSE SERPL-MCNC: 93 MG/DL (ref 74–99)
HCT VFR BLD AUTO: 34.9 % (ref 35–45)
HDLC SERPL-MCNC: 56 MG/DL (ref 40–60)
HDLC SERPL: 4 (ref 0–5)
HGB BLD-MCNC: 10.9 G/DL (ref 12–16)
IMM GRANULOCYTES # BLD AUTO: 0 K/UL (ref 0–0.04)
IMM GRANULOCYTES NFR BLD AUTO: 0 % (ref 0–0.5)
LDLC SERPL CALC-MCNC: 150.6 MG/DL (ref 0–100)
LIPID PANEL: ABNORMAL
LYMPHOCYTES # BLD: 1.3 K/UL (ref 0.9–3.6)
LYMPHOCYTES NFR BLD: 19 % (ref 21–52)
MAGNESIUM SERPL-MCNC: 2.5 MG/DL (ref 1.6–2.6)
MCH RBC QN AUTO: 26.5 PG (ref 24–34)
MCHC RBC AUTO-ENTMCNC: 31.2 G/DL (ref 31–37)
MCV RBC AUTO: 84.7 FL (ref 78–100)
MONOCYTES # BLD: 0.8 K/UL (ref 0.05–1.2)
MONOCYTES NFR BLD: 11 % (ref 3–10)
NEUTS SEG # BLD: 4.6 K/UL (ref 1.8–8)
NEUTS SEG NFR BLD: 66 % (ref 40–73)
NRBC # BLD: 0 K/UL (ref 0–0.01)
NRBC BLD-RTO: 0 PER 100 WBC
PLATELET # BLD AUTO: 206 K/UL (ref 135–420)
PMV BLD AUTO: 11.8 FL (ref 9.2–11.8)
POTASSIUM SERPL-SCNC: 3.4 MMOL/L (ref 3.5–5.5)
RBC # BLD AUTO: 4.12 M/UL (ref 4.2–5.3)
SODIUM SERPL-SCNC: 140 MMOL/L (ref 136–145)
TRIGL SERPL-MCNC: 87 MG/DL
TSH SERPL DL<=0.05 MIU/L-ACNC: 1.24 UIU/ML (ref 0.36–3.74)
VAS LEFT BULB EDV: 20.4 CM/S
VAS LEFT BULB PSV: 57.8 CM/S
VAS LEFT CCA DIST EDV: 9.5 CM/S
VAS LEFT CCA DIST PSV: 57.8 CM/S
VAS LEFT CCA MID EDV: 17.15 CM/S
VAS LEFT CCA MID PSV: 75.4 CM/S
VAS LEFT CCA PROX EDV: 19.3 CM/S
VAS LEFT CCA PROX PSV: 74.3 CM/S
VAS LEFT ECA EDV: 0 CM/S
VAS LEFT ECA PSV: 41.7 CM/S
VAS LEFT ICA DIST EDV: 19.3 CM/S
VAS LEFT ICA DIST PSV: 55.6 CM/S
VAS LEFT ICA MID EDV: 21.5 CM/S
VAS LEFT ICA MID PSV: 63.3 CM/S
VAS LEFT ICA PROX EDV: 24.8 CM/S
VAS LEFT ICA PROX PSV: 72.1 CM/S
VAS LEFT ICA/CCA PSV: 1.25 NO UNITS
VAS LEFT SUBCLAVIAN PROX EDV: 0 CM/S
VAS LEFT SUBCLAVIAN PROX PSV: 92.7 CM/S
VAS LEFT VERTEBRAL EDV: 11.98 CM/S
VAS LEFT VERTEBRAL PSV: 51.5 CM/S
VAS RIGHT BULB EDV: 13.6 CM/S
VAS RIGHT BULB PSV: 41.5 CM/S
VAS RIGHT CCA DIST EDV: 8.4 CM/S
VAS RIGHT CCA DIST PSV: 36.3 CM/S
VAS RIGHT CCA MID EDV: 13.8 CM/S
VAS RIGHT CCA MID PSV: 61.1 CM/S
VAS RIGHT CCA PROX EDV: 15 CM/S
VAS RIGHT CCA PROX PSV: 76.5 CM/S
VAS RIGHT ECA EDV: 0 CM/S
VAS RIGHT ECA PSV: 53.4 CM/S
VAS RIGHT ICA DIST EDV: 24.6 CM/S
VAS RIGHT ICA DIST PSV: 78.7 CM/S
VAS RIGHT ICA MID EDV: 18.8 CM/S
VAS RIGHT ICA MID PSV: 59.8 CM/S
VAS RIGHT ICA PROX EDV: 22.5 CM/S
VAS RIGHT ICA PROX PSV: 72.5 CM/S
VAS RIGHT ICA/CCA PSV: 1.03 NO UNITS
VAS RIGHT SUBCLAVIAN PROX EDV: 0 CM/S
VAS RIGHT SUBCLAVIAN PROX PSV: 90.3 CM/S
VAS RIGHT VERTEBRAL EDV: 19.34 CM/S
VAS RIGHT VERTEBRAL PSV: 57.8 CM/S
VLDLC SERPL CALC-MCNC: 17.4 MG/DL
WBC # BLD AUTO: 7 K/UL (ref 4.6–13.2)

## 2023-08-14 PROCEDURE — 80061 LIPID PANEL: CPT

## 2023-08-14 PROCEDURE — 93010 ELECTROCARDIOGRAM REPORT: CPT | Performed by: INTERNAL MEDICINE

## 2023-08-14 PROCEDURE — 99231 SBSQ HOSP IP/OBS SF/LOW 25: CPT | Performed by: PHYSICIAN ASSISTANT

## 2023-08-14 PROCEDURE — 84443 ASSAY THYROID STIM HORMONE: CPT

## 2023-08-14 PROCEDURE — 96372 THER/PROPH/DIAG INJ SC/IM: CPT

## 2023-08-14 PROCEDURE — 85025 COMPLETE CBC W/AUTO DIFF WBC: CPT

## 2023-08-14 PROCEDURE — 94761 N-INVAS EAR/PLS OXIMETRY MLT: CPT

## 2023-08-14 PROCEDURE — 6360000002 HC RX W HCPCS: Performed by: STUDENT IN AN ORGANIZED HEALTH CARE EDUCATION/TRAINING PROGRAM

## 2023-08-14 PROCEDURE — 83735 ASSAY OF MAGNESIUM: CPT

## 2023-08-14 PROCEDURE — 93880 EXTRACRANIAL BILAT STUDY: CPT | Performed by: INTERNAL MEDICINE

## 2023-08-14 PROCEDURE — 96361 HYDRATE IV INFUSION ADD-ON: CPT

## 2023-08-14 PROCEDURE — 36415 COLL VENOUS BLD VENIPUNCTURE: CPT

## 2023-08-14 PROCEDURE — 80048 BASIC METABOLIC PNL TOTAL CA: CPT

## 2023-08-14 PROCEDURE — C8929 TTE W OR WO FOL WCON,DOPPLER: HCPCS

## 2023-08-14 PROCEDURE — 2580000003 HC RX 258: Performed by: STUDENT IN AN ORGANIZED HEALTH CARE EDUCATION/TRAINING PROGRAM

## 2023-08-14 PROCEDURE — 93005 ELECTROCARDIOGRAM TRACING: CPT | Performed by: PHYSICIAN ASSISTANT

## 2023-08-14 PROCEDURE — G0378 HOSPITAL OBSERVATION PER HR: HCPCS

## 2023-08-14 PROCEDURE — 6360000004 HC RX CONTRAST MEDICATION: Performed by: HOSPITALIST

## 2023-08-14 PROCEDURE — 6370000000 HC RX 637 (ALT 250 FOR IP): Performed by: STUDENT IN AN ORGANIZED HEALTH CARE EDUCATION/TRAINING PROGRAM

## 2023-08-14 PROCEDURE — 2580000003 HC RX 258: Performed by: HOSPITALIST

## 2023-08-14 PROCEDURE — 93880 EXTRACRANIAL BILAT STUDY: CPT

## 2023-08-14 RX ORDER — SODIUM CHLORIDE 9 MG/ML
INJECTION, SOLUTION INTRAVENOUS CONTINUOUS
Status: DISPENSED | OUTPATIENT
Start: 2023-08-14 | End: 2023-08-15

## 2023-08-14 RX ADMIN — PERFLUTREN 2 ML: 6.52 INJECTION, SUSPENSION INTRAVENOUS at 12:53

## 2023-08-14 RX ADMIN — SODIUM CHLORIDE: 9 INJECTION, SOLUTION INTRAVENOUS at 13:17

## 2023-08-14 RX ADMIN — LAMOTRIGINE 100 MG: 100 TABLET ORAL at 09:58

## 2023-08-14 RX ADMIN — ENOXAPARIN SODIUM 40 MG: 100 INJECTION SUBCUTANEOUS at 09:59

## 2023-08-14 RX ADMIN — SODIUM CHLORIDE, PRESERVATIVE FREE 10 ML: 5 INJECTION INTRAVENOUS at 09:59

## 2023-08-14 RX ADMIN — LISINOPRIL 5 MG: 2.5 TABLET ORAL at 09:59

## 2023-08-14 RX ADMIN — PANTOPRAZOLE SODIUM 40 MG: 40 TABLET, DELAYED RELEASE ORAL at 06:46

## 2023-08-14 RX ADMIN — FLUOXETINE 20 MG: 20 CAPSULE ORAL at 09:58

## 2023-08-14 RX ADMIN — BUPROPION HYDROCHLORIDE 100 MG: 100 TABLET, FILM COATED ORAL at 09:58

## 2023-08-14 NOTE — PROGRESS NOTES
Patient was at [facility] with her 59-year-old son when she \"fainted. \"  She denies any episode of chest pain. Denies preceding spots in front of her eyes, room spinning. Knew she was going down, lost consciousness for a few seconds. Son did not witness any seizure activity. She denies history of seizure. Did not bite her tongue, did not experience loss of bowel or bladder, her neck and shoulders, back are not sore. She denies dysuria. States she is still thirsty since yesterday, she is drinking water. Her urine is still dark per her description. Awaiting echo.  at bedside.

## 2023-08-14 NOTE — CARE COORDINATION
Case Management Assessment  Initial Evaluation    Date/Time of Evaluation: 8/14/2023 3:27 PM  Assessment Completed by: Stanley Middleton RN    If patient is discharged prior to next notation, then this note serves as note for discharge by case management. Patient Name: Qasim Elliott                   YOB: 1950  Diagnosis: Mitral valve insufficiency and aortic valve insufficiency [I08.0]  Hepatic cyst [K76.89]  Thyroid nodule [E04.1]  Renal cyst [N28.1]  Prolonged Q-T interval on ECG [R94.31]  Pre-syncope [R55]  Near syncope [R55]  History of heart failure [Z86.79]  Other secondary hypertension [I15.8]                   Date / Time: 8/13/2023  1:32 PM    Patient Admission Status: Observation   Readmission Risk (Low < 19, Mod (19-27), High > 27): No data recorded  Current PCP: LORELEI Baez  PCP verified by CM? (P) Yes    Chart Reviewed: Yes      History Provided by: (P) Patient  Patient Orientation: (P) Alert and Oriented    Patient Cognition: (P) Alert    Hospitalization in the last 30 days (Readmission):  No    If yes, Readmission Assessment in CM Navigator will be completed.     Advance Directives:      Code Status: Full Code   Patient's Primary Decision Maker is:        Discharge Planning:    Patient lives with: (P) Spouse/Significant Other Type of Home: (P) House  Primary Care Giver: (P) Self  Patient Support Systems include: (P) Spouse/Significant Other, Children   Current Financial resources: (P) Medicaid, Medicare  Current community resources:    Current services prior to admission: (P) None            Current DME:              Type of Home Care services:  (P) None    ADLS  Prior functional level: (P) Independent in ADLs/IADLs, Assistance with the following:, Cooking, Shopping  Current functional level: (P) Independent in ADLs/IADLs, Assistance with the following:, Cooking, Shopping    PT AM-PAC:   /24  OT AM-PAC:   /24    Family can provide assistance at DC: (P) Yes  Would you like Cognition Alert   History Provided By Patient   Primary Caregiver Self   Accompanied By/Relationship    Support Systems Spouse/Significant Other;Children   PCP Verified by CM Yes   Last Visit to PCP Within last 3 months   Prior Functional Level Independent in ADLs/IADLs;Assistance with the following:;Cooking; Shopping   Current Functional Level Independent in ADLs/IADLs;Assistance with the following:;Cooking; Shopping   Can patient return to prior living arrangement Yes   Ability to make needs known: Good   Family able to assist with home care needs: Yes   Would you like for me to discuss the discharge plan with any other family members/significant others, and if so, who? Yes  ()   Financial Resources Medicaid; Medicare   Social/Functional History   Lives With Spouse   Type of [address] [place] [address] Two level; Able to Live on Main level with bedroom/bathroom   Home Access Stairs to enter with rails   Entrance Stairs - Number of Steps 4 steps   Bathroom Shower/Tub Tub/Shower unit   Bathroom Toilet Standard   Bathroom Equipment Shower chair   [address] [facility] Help From Family   ADL Assistance Independent   Homemaking Assistance Independent   Homemaking Responsibilities Yes   Ambulation Assistance Independent   Transfer Assistance Independent   Active  Yes   Mode of Transportation Car   Occupation Retired   Type of Occupation [employer]   Discharge Planning   Type of [address] [place] [address] Prior To Admission None   Potential Assistance Needed N/A   DME Ordered? No   Potential Assistance Purchasing Medications No   Type of Home Care Services None   Patient expects to be discharged to: House   One/Two Story Residence Two story, live on first floor   Lift Chair Available No   History of falls?  0   Services At/After Discharge   Transition of Care Consult (CM Consult) Discharge

## 2023-08-14 NOTE — PROGRESS NOTES
Orthostatic blood pressure taken; readings are as followed  Lyin/74 HR 87  Sitting 138/81 HR 89  Standing 110/68 HR 97

## 2023-08-14 NOTE — CONSULTS
[facility] - Consult Note    Cardiology consultation request from Mirella Padilla PA-C for evaluation and management/treatment of syncope, prolonged QT    Date of  Admission: 8/13/2023  1:32 PM   Primary Care Physician:  LORELEI Phelan    Attending Cardiologist: Dr. Mayi Castaneda:     -Syncope  -Prolonged QT  -HFrEF/Hx NICMY, LVEF 15% in 2015; LVEF 42% by Echo 03/2020  LHC 03/2016 with normal coronary arteries  Echo 03/2023: LVEF 35% with global hypokinesis, concentric LVH, indeterminate LV diastolic function, RV systolic function normal, mild to moderate mitral regurgitation, severely enlarged LA; new hyperechoic calcific structure near aorta of unknown clinical significance   Intolerance to [facility] - became lightheaded/near fainting after second dose, vomiting/disorientation after 3rd dose  -Hx AVR, MVV, TVV 03/2014  Echo 03/2023 with well-seated mitral valve annuloplasty ring; bioprosthetic aortic valve appears well-seated with no evidence of paravalvular leakage; tricuspid valve annuloplasty ring appears stable  CTA chest 03/2023 with post-surgical changes of aortic, tricuspid and mitral valve replacement; no suspicious calcific mass identified as queried on comparison Echo  -S/p SVT (AFL) ablation by Dr. Danelle Sandoval 05/2014  -HTN  -Hypothyroidism    Primary cardiologist is Dr. Mar Cowden:     -Echo pending, will review results as able. -Consider d/c Prozac due to prolonged Qtc 530 ms on EKG. -Continue telemetry monitoring while inpatient.  -Further recommendations based on test results, hospital course. History of Present Illness: This is a 68 y.o. female admitted for Mitral valve insufficiency and aortic valve insufficiency [I08.0]  Hepatic cyst [K76.89]  Thyroid nodule [E04.1]  Renal cyst [N28.1]  Prolonged Q-T interval on ECG [R94.31]  Pre-syncope [R55]  Near syncope [R55]  History of heart failure [Z86.79]  Other secondary hypertension [I15.8].     Patient complains valve insufficiency     Mild AI/mild MR, no symptoms     Neuropathy     Other primary cardiomyopathies 7/18/2013    ef decreased again to 35-40% unclear non ischemic etiology     Other specified cardiac dysrhythmias(427.89)     OCC PVC, one 4 beat SVT    Palpitations     10/12 recurrent post ablation of SVT in 9/12; EKG with S Tach in 130s today; obtain results of recent labs from PCP, obtain results if recent Holter from Dr. Hal Lambert    Paroxysmal supraventricular tachycardia ([address])     9/12 ablation     Paroxysmal supraventricular tachycardia ([address])     9/12 ablation    Psychiatric disorder          Social History:     Social History     Socioeconomic History    Marital status:      Spouse name: None    Number of children: None    Years of education: None    Highest education level: None   Tobacco Use    Smoking status: Never    Smokeless tobacco: Never   Substance and Sexual Activity    Alcohol use: No    Drug use: No        Family History:     Family History   Problem Relation Age of Onset    Heart Attack Mother 52    Heart Attack Father 61    Heart Attack Other     Sudden Death Neg Hx     Stroke Neg Hx     Hypertension Other         Medications:      Allergies   Allergen Reactions    Latex Rash     PT DENIES    Carvedilol Diarrhea    Digoxin Diarrhea    Adhesive Tape Rash    Amoxicillin-Pot Clavulanate Diarrhea, Nausea And Vomiting and Rash    Metoprolol Diarrhea and Nausea And Vomiting     virtigo          Current Facility-Administered Medications   Medication Dose Route Frequency    sodium chloride flush 0.9 % injection 5-40 mL  5-40 mL IntraVENous 2 times per day    sodium chloride flush 0.9 % injection 5-40 mL  5-40 mL IntraVENous PRN    0.9 % sodium chloride infusion   IntraVENous PRN    polyethylene glycol (GLYCOLAX) packet 17 g  17 g Oral Daily PRN    acetaminophen (TYLENOL) tablet 650 mg  650 mg Oral Q6H PRN    Or    acetaminophen (TYLENOL) suppository 650 mg  650 mg Rectal Q6H PRN    ondansetron

## 2023-08-14 NOTE — PROGRESS NOTES
Substitution Information per the P&T Committee approved Therapeutic Interchanges Policy    Nonformulary Medication Formulary Interchange   buPROPion [facility] SR) extended release 100 mg daily buPROPion (WELLBUTRIN IR) 100 mg daily

## 2023-08-14 NOTE — H&P
History & Physical    Patient: Muriel Rodriguez MRN: [id number]  [facility]: [id number]    YOB: 1950  Age: 68 y.o. Sex: female             DOA: 8/13/2023    Chief Complaint:   Chief Complaint   Patient presents with    Shortness of Breath    Loss of Consciousness          HPI:    Muriel Rodriguez is a 68 y.o.  female who past medical history of mitral valve and aortic valve insufficiency s/p valve replacement and repair x3, hypothyroidism, hypertension, HFrEF(35% as of 03/23), Hx of paroxysmal SVT came emergency department after she had an episode of near syncope/syncope, vomiting x2 after syncope. Patient has baseline dyspnea on exertion and shortness of breath. As per patient she was standing at the register in the store holding onto the counter and suddenly lost her consciousness. Denied dizziness, lightheadedness, blurring of vision, chest pain, shortness of breath. ED course: Vital signs stable  Blood work grossly normal except mag 1.6, EKG showed normal sinus rhythm with prolonged QTc, CTA negative for PE  Discussed with ED provider and labs, imaging/EKG reviewed, agree to admit patient for observation and repeat 2D echo, cardiology consult in a.m. Past Medical History:   Diagnosis Date    CC (collagenous colitis)     Chronic combined systolic and diastolic heart failure ([address]) 7/18/2013    sob on exertion,class3     Endometriosis     Essential hypertension, benign     The hypertension is stable. UNABLE TO TOLERATE BETA BLOCKER, PT BACK ON LISINOPRIL 10 MG A DAY. Essential hypertension, benign     The hypertension is stable. UNABLE TO TOLERATE BETA BLOCKER, PT BACK ON LISINOPRIL 10 MG A DAY.     Hypertension     Hypothyroid     Mitral valve insufficiency and aortic valve insufficiency     Mild AI/mild MR, no symptoms    Mitral valve insufficiency and aortic valve insufficiency     Mild AI/mild MR, no symptoms Neuropathy     Other primary cardiomyopathies 2013    ef decreased again to 35-40% unclear non ischemic etiology     Other specified cardiac dysrhythmias(427.89)     OCC PVC, one 4 beat SVT    Palpitations     10/12 recurrent post ablation of SVT in ; EKG with S Tach in 130s today; obtain results of recent labs from PCP, obtain results if recent Holter from Dr. Dayana Peterson    Paroxysmal supraventricular tachycardia ([address])      ablation     Paroxysmal supraventricular tachycardia ([address])      ablation    Psychiatric disorder        Past Surgical History:   Procedure Laterality Date    AORTIC VALVE REPLACEMENT      BREAST REDUCTION SURGERY      CARPAL TUNNEL RELEASE       SECTION      x 2     CHOLECYSTECTOMY      DILATION AND CURETTAGE OF UTERUS      HERNIA REPAIR      Incisional herniorrhaphy    ORTHOPEDIC SURGERY      right ankle and left foot    [place] UNLISTED PROCEDURE CARDIAC SURGERY      ablation;  for SVT - slow pathway; Dr. Shwetha Jo LEFT Left 2021    US BREAST NEEDLE BIOPSY LEFT 2021 [facility] RAD US       Family History   Problem Relation Age of Onset    Heart Attack Mother 52    Heart Attack Father 61    Heart Attack Other     Sudden Death Neg Hx     Stroke Neg Hx     Hypertension Other        Social History     Socioeconomic History    Marital status:      Spouse name: None    Number of children: None    Years of education: None    Highest education level: None   Tobacco Use    Smoking status: Never    Smokeless tobacco: Never   Substance and Sexual Activity    Alcohol use: No    Drug use: No       Prior to Admission medications    Medication Sig Start Date End Date Taking?  Authorizing Provider   aspirin 325 MG tablet Take 325 mg by mouth daily    Ar Automatic Reconciliation   buPROPion (WELLBUTRIN SR) 150 MG extended release tablet Take by mouth 2 times daily    Ar Automatic Reconciliation   diazePAM (VALIUM) 5 MG tablet

## 2023-08-15 ENCOUNTER — APPOINTMENT (OUTPATIENT)
Facility: HOSPITAL | Age: 73
End: 2023-08-15
Payer: MEDICARE

## 2023-08-15 VITALS
BODY MASS INDEX: 29.06 KG/M2 | TEMPERATURE: 97.7 F | DIASTOLIC BLOOD PRESSURE: 59 MMHG | OXYGEN SATURATION: 98 % | SYSTOLIC BLOOD PRESSURE: 111 MMHG | WEIGHT: 164 LBS | HEART RATE: 90 BPM | HEIGHT: 63 IN | RESPIRATION RATE: 17 BRPM

## 2023-08-15 PROBLEM — Z86.79 HISTORY OF HEART FAILURE: Status: RESOLVED | Noted: 2023-08-14 | Resolved: 2023-08-15

## 2023-08-15 PROBLEM — R94.31 PROLONGED Q-T INTERVAL ON ECG: Status: RESOLVED | Noted: 2023-08-14 | Resolved: 2023-08-15

## 2023-08-15 PROBLEM — R55 NEAR SYNCOPE: Status: RESOLVED | Noted: 2023-08-13 | Resolved: 2023-08-15

## 2023-08-15 LAB
ANION GAP SERPL CALC-SCNC: 6 MMOL/L (ref 3–18)
ANION GAP SERPL CALC-SCNC: 6 MMOL/L (ref 3–18)
BUN SERPL-MCNC: 12 MG/DL (ref 7–18)
BUN SERPL-MCNC: 13 MG/DL (ref 7–18)
BUN/CREAT SERPL: 13 (ref 12–20)
BUN/CREAT SERPL: 13 (ref 12–20)
CALCIUM SERPL-MCNC: 8.1 MG/DL (ref 8.5–10.1)
CALCIUM SERPL-MCNC: 8.2 MG/DL (ref 8.5–10.1)
CHLORIDE SERPL-SCNC: 112 MMOL/L (ref 100–111)
CHLORIDE SERPL-SCNC: 113 MMOL/L (ref 100–111)
CO2 SERPL-SCNC: 23 MMOL/L (ref 21–32)
CO2 SERPL-SCNC: 23 MMOL/L (ref 21–32)
CREAT SERPL-MCNC: 0.93 MG/DL (ref 0.6–1.3)
CREAT SERPL-MCNC: 1.04 MG/DL (ref 0.6–1.3)
ERYTHROCYTE [DISTWIDTH] IN BLOOD BY AUTOMATED COUNT: 14 % (ref 11.6–14.5)
FOLATE SERPL-MCNC: >20 NG/ML (ref 3.1–17.5)
GLUCOSE SERPL-MCNC: 87 MG/DL (ref 74–99)
GLUCOSE SERPL-MCNC: 91 MG/DL (ref 74–99)
HCT VFR BLD AUTO: 34.7 % (ref 35–45)
HGB BLD-MCNC: 10.8 G/DL (ref 12–16)
MAGNESIUM SERPL-MCNC: 2.2 MG/DL (ref 1.6–2.6)
MCH RBC QN AUTO: 26.3 PG (ref 24–34)
MCHC RBC AUTO-ENTMCNC: 31.1 G/DL (ref 31–37)
MCV RBC AUTO: 84.4 FL (ref 78–100)
NRBC # BLD: 0 K/UL (ref 0–0.01)
NRBC BLD-RTO: 0 PER 100 WBC
PLATELET # BLD AUTO: 179 K/UL (ref 135–420)
PMV BLD AUTO: 12 FL (ref 9.2–11.8)
POTASSIUM SERPL-SCNC: 3.1 MMOL/L (ref 3.5–5.5)
POTASSIUM SERPL-SCNC: 3.6 MMOL/L (ref 3.5–5.5)
RBC # BLD AUTO: 4.11 M/UL (ref 4.2–5.3)
SODIUM SERPL-SCNC: 141 MMOL/L (ref 136–145)
SODIUM SERPL-SCNC: 142 MMOL/L (ref 136–145)
T4 FREE SERPL-MCNC: 1 NG/DL (ref 0.7–1.5)
VIT B12 SERPL-MCNC: 372 PG/ML (ref 211–911)
WBC # BLD AUTO: 5.6 K/UL (ref 4.6–13.2)

## 2023-08-15 PROCEDURE — G0378 HOSPITAL OBSERVATION PER HR: HCPCS

## 2023-08-15 PROCEDURE — 6370000000 HC RX 637 (ALT 250 FOR IP): Performed by: HOSPITALIST

## 2023-08-15 PROCEDURE — 96367 TX/PROPH/DG ADDL SEQ IV INF: CPT

## 2023-08-15 PROCEDURE — 96375 TX/PRO/DX INJ NEW DRUG ADDON: CPT

## 2023-08-15 PROCEDURE — 80048 BASIC METABOLIC PNL TOTAL CA: CPT

## 2023-08-15 PROCEDURE — 84439 ASSAY OF FREE THYROXINE: CPT

## 2023-08-15 PROCEDURE — 2580000003 HC RX 258: Performed by: STUDENT IN AN ORGANIZED HEALTH CARE EDUCATION/TRAINING PROGRAM

## 2023-08-15 PROCEDURE — 96372 THER/PROPH/DIAG INJ SC/IM: CPT

## 2023-08-15 PROCEDURE — 36415 COLL VENOUS BLD VENIPUNCTURE: CPT

## 2023-08-15 PROCEDURE — 6370000000 HC RX 637 (ALT 250 FOR IP): Performed by: STUDENT IN AN ORGANIZED HEALTH CARE EDUCATION/TRAINING PROGRAM

## 2023-08-15 PROCEDURE — 85027 COMPLETE CBC AUTOMATED: CPT

## 2023-08-15 PROCEDURE — 96361 HYDRATE IV INFUSION ADD-ON: CPT

## 2023-08-15 PROCEDURE — 83735 ASSAY OF MAGNESIUM: CPT

## 2023-08-15 PROCEDURE — 82607 VITAMIN B-12: CPT

## 2023-08-15 PROCEDURE — 97165 OT EVAL LOW COMPLEX 30 MIN: CPT

## 2023-08-15 PROCEDURE — 82746 ASSAY OF FOLIC ACID SERUM: CPT

## 2023-08-15 PROCEDURE — 96366 THER/PROPH/DIAG IV INF ADDON: CPT

## 2023-08-15 PROCEDURE — 6360000002 HC RX W HCPCS: Performed by: STUDENT IN AN ORGANIZED HEALTH CARE EDUCATION/TRAINING PROGRAM

## 2023-08-15 RX ORDER — MAGNESIUM SULFATE IN WATER 40 MG/ML
2000 INJECTION, SOLUTION INTRAVENOUS PRN
Status: DISCONTINUED | OUTPATIENT
Start: 2023-08-15 | End: 2023-08-15 | Stop reason: HOSPADM

## 2023-08-15 RX ORDER — LOPERAMIDE HYDROCHLORIDE 2 MG/1
2 CAPSULE ORAL 4 TIMES DAILY PRN
Status: DISCONTINUED | OUTPATIENT
Start: 2023-08-15 | End: 2023-08-15 | Stop reason: HOSPADM

## 2023-08-15 RX ORDER — POTASSIUM CHLORIDE 7.45 MG/ML
10 INJECTION INTRAVENOUS PRN
Status: DISCONTINUED | OUTPATIENT
Start: 2023-08-15 | End: 2023-08-15 | Stop reason: HOSPADM

## 2023-08-15 RX ORDER — CHOLECALCIFEROL (VITAMIN D3) 125 MCG
500 CAPSULE ORAL DAILY
Status: DISCONTINUED | OUTPATIENT
Start: 2023-08-15 | End: 2023-08-15 | Stop reason: HOSPADM

## 2023-08-15 RX ORDER — POTASSIUM CHLORIDE 29.8 MG/ML
20 INJECTION INTRAVENOUS PRN
Status: DISCONTINUED | OUTPATIENT
Start: 2023-08-15 | End: 2023-08-15 | Stop reason: SDUPTHER

## 2023-08-15 RX ADMIN — LOPERAMIDE HYDROCHLORIDE 2 MG: 2 CAPSULE ORAL at 00:50

## 2023-08-15 RX ADMIN — LAMOTRIGINE 100 MG: 100 TABLET ORAL at 08:44

## 2023-08-15 RX ADMIN — POTASSIUM CHLORIDE 10 MEQ: 7.46 INJECTION, SOLUTION INTRAVENOUS at 10:49

## 2023-08-15 RX ADMIN — POTASSIUM CHLORIDE 10 MEQ: 7.46 INJECTION, SOLUTION INTRAVENOUS at 06:21

## 2023-08-15 RX ADMIN — SODIUM CHLORIDE, PRESERVATIVE FREE 10 ML: 5 INJECTION INTRAVENOUS at 08:44

## 2023-08-15 RX ADMIN — ENOXAPARIN SODIUM 40 MG: 100 INJECTION SUBCUTANEOUS at 08:44

## 2023-08-15 RX ADMIN — Medication 500 MCG: at 14:49

## 2023-08-15 RX ADMIN — POTASSIUM CHLORIDE 10 MEQ: 7.46 INJECTION, SOLUTION INTRAVENOUS at 09:49

## 2023-08-15 RX ADMIN — POTASSIUM CHLORIDE 10 MEQ: 7.46 INJECTION, SOLUTION INTRAVENOUS at 08:49

## 2023-08-15 RX ADMIN — PANTOPRAZOLE SODIUM 40 MG: 40 TABLET, DELAYED RELEASE ORAL at 06:08

## 2023-08-15 RX ADMIN — LISINOPRIL 5 MG: 2.5 TABLET ORAL at 08:44

## 2023-08-15 RX ADMIN — BUPROPION HYDROCHLORIDE 100 MG: 100 TABLET, FILM COATED ORAL at 08:44

## 2023-08-15 RX ADMIN — ONDANSETRON 4 MG: 2 INJECTION INTRAMUSCULAR; INTRAVENOUS at 08:43

## 2023-08-15 RX ADMIN — POTASSIUM CHLORIDE 10 MEQ: 7.46 INJECTION, SOLUTION INTRAVENOUS at 11:49

## 2023-08-15 ASSESSMENT — PAIN SCALES - GENERAL
PAINLEVEL_OUTOF10: 0
PAINLEVEL_OUTOF10: 0

## 2023-08-15 NOTE — PROGRESS NOTES
Physical Therapy    PT order received and chart reviewed. Spoke with pt at bedside who endorses no mobility concerns, is at her functional baseline. Gets up ad mercedes in room with no AD. Will sign off.  Thank you for this referral. Margaret Vance, PT, DPT

## 2023-08-15 NOTE — PROGRESS NOTES
[phone] Attempted to contact EKG but was informed by  that there was not an EKG Tech on call. Checked with supervisor as to if there was a way to contact EKG Tech after hours to place event monitors. She was not aware. Dr. Bowman  made aware of this.

## 2023-08-15 NOTE — CARE COORDINATION
D/C order noted for today. Orders reviewed. No needs identified at this time. Patient's  to transport patient home today at time of discharge. CM remains available if needed.            Peter Anton, [phone]

## 2023-08-15 NOTE — PLAN OF CARE
Problem: Discharge Planning  Goal: Discharge to home or other facility with appropriate resources  Outcome: Adequate for Discharge  Flowsheets (Taken 8/15/2023 0834)  Discharge to home or other facility with appropriate resources: Identify barriers to discharge with patient and caregiver     Problem: Safety - Adult  Goal: Free from fall injury  Outcome: Adequate for Discharge     Problem: Pain  Goal: Verbalizes/displays adequate comfort level or baseline comfort level  Outcome: Adequate for Discharge     Problem: Occupational Therapy - Adult  Goal: By Discharge: Performs self-care activities at highest level of function for planned discharge setting. See evaluation for individualized goals.   8/15/2023 1252 by Gisela Rivas OT  Outcome: Completed

## 2023-08-15 NOTE — PROGRESS NOTES
Medication List        START taking these medications      cyanocobalamin 500 MCG tablet  Take 1 tablet by mouth daily  Start taking on: August 16, 2023            [address] taking these medications      aspirin 325 MG tablet     buPROPion 150 MG extended release tablet  Commonly known as: WELLBUTRIN SR     diazePAM 5 MG tablet  Commonly known as: VALIUM     diclofenac sodium 1 % Gel  Commonly known as: VOLTAREN     FLUoxetine 20 MG capsule  Commonly known as: PROZAC     folic acid 1 MG tablet  Commonly known as: FOLVITE     lamoTRIgine 100 MG tablet  Commonly known as: LAMICTAL     Lidocaine HCl 4 % Crea     lisinopril 5 MG tablet  Commonly known as: PRINIVIL;ZESTRIL     pantoprazole 40 MG tablet  Commonly known as: PROTONIX     temazepam 30 MG capsule  Commonly known as: RESTORIL            STOP taking these medications      ibuprofen 600 MG tablet  Commonly known as: ADVIL;MOTRIN     omeprazole 40 MG delayed release capsule  Commonly known as: PRILOSEC               Where to Get Your Medications        These medications were sent to [address] [place] [id number]      Phone: [phone]   cyanocobalamin 500 MCG tablet

## 2024-01-11 ENCOUNTER — HOSPITAL ENCOUNTER (OUTPATIENT)
Facility: HOSPITAL | Age: 74
Setting detail: RECURRING SERIES
Discharge: HOME | End: 2024-01-14
Payer: MEDICARE

## 2024-01-11 VITALS — BODY MASS INDEX: 28.87 KG/M2 | WEIGHT: 163 LBS

## 2024-01-11 PROCEDURE — 93798 PHYS/QHP OP CAR RHAB W/ECG: CPT

## 2024-01-11 RX ORDER — FUROSEMIDE 20 MG/1
20 TABLET ORAL 2 TIMES DAILY
COMMUNITY

## 2024-01-11 ASSESSMENT — PATIENT HEALTH QUESTIONNAIRE - PHQ9
1. LITTLE INTEREST OR PLEASURE IN DOING THINGS: 0
7. TROUBLE CONCENTRATING ON THINGS, SUCH AS READING THE NEWSPAPER OR WATCHING TELEVISION: 0
4. FEELING TIRED OR HAVING LITTLE ENERGY: 3
5. POOR APPETITE OR OVEREATING: 3
SUM OF ALL RESPONSES TO PHQ9 QUESTIONS 1 & 2: 0
SUM OF ALL RESPONSES TO PHQ QUESTIONS 1-9: 6
2. FEELING DOWN, DEPRESSED OR HOPELESS: 0
SUM OF ALL RESPONSES TO PHQ QUESTIONS 1-9: 6
10. IF YOU CHECKED OFF ANY PROBLEMS, HOW DIFFICULT HAVE THESE PROBLEMS MADE IT FOR YOU TO DO YOUR WORK, TAKE CARE OF THINGS AT HOME, OR GET ALONG WITH OTHER PEOPLE: 1
6. FEELING BAD ABOUT YOURSELF - OR THAT YOU ARE A FAILURE OR HAVE LET YOURSELF OR YOUR FAMILY DOWN: 0
9. THOUGHTS THAT YOU WOULD BE BETTER OFF DEAD, OR OF HURTING YOURSELF: 0
SUM OF ALL RESPONSES TO PHQ QUESTIONS 1-9: 6
SUM OF ALL RESPONSES TO PHQ QUESTIONS 1-9: 6
3. TROUBLE FALLING OR STAYING ASLEEP: 0
8. MOVING OR SPEAKING SO SLOWLY THAT OTHER PEOPLE COULD HAVE NOTICED. OR THE OPPOSITE, BEING SO FIGETY OR RESTLESS THAT YOU HAVE BEEN MOVING AROUND A LOT MORE THAN USUAL: 0

## 2024-01-11 ASSESSMENT — EXERCISE STRESS TEST
PEAK_BP: 125/76
PEAK_HR: 107
PEAK_BP: 125/76
PEAK_HR: 107
PEAK_RPD: 8
PEAK_RPE: 14
PEAK_BP: 125/76
PEAK_RPE: 14
PEAK_METS: 1.7

## 2024-01-11 ASSESSMENT — EJECTION FRACTION: EF_VALUE: 35

## 2024-01-11 NOTE — PROGRESS NOTES
CARDIAC REHAB INITIAL ITP FOR REVIEW AND SIGNATURE    Jeannine Finney 73 y.o. presented to cardiac rehab for an intake and a six minute walk test today with a primary diagnosis of CHF.  Patient's EF is 35%. Jeannine Finney has a history of hyperlipidemia. Cardiac risk factors include family history, dyslipidemia and these were reviewed with patient.      Jeaninne Finney is   and lives with their spouse. PHQ-9, depression score, is 6 and this is considered to be high. The result was discussed with patient who confirms score to be accurate and if above a 5, a copy of the results were sent to patient's PCP.    Patient denied chest pain or SOB during 6 minute walk and the cardiac rhythm was in Sinus Rythym/Sinus Tachycardia     Jeannine Finney will attend exercise and educational sessions 2-3 days a week in cardiac rehab for 36 sessions. Exceptions noted during intake include long history of fatigue.  Needs encouragement and support.         Patient name: Jeannine Finney : 1950     Goals Comments   1. Increase endurance and stamina by end of program    [x] initial  [] met                  [] not met  [] progressing  Pt will be able to complete 45 minutes of exercise without c/o shortness of breath or fatigue. MET GOAL by next recert 2.0-3.0   2. Eat a heart healthy diet by end of program    [x] initial  [] met                  [] not met  [] progressing Pt is educated to make \"doable\" diet modifications and limit highly-processed foods for special occasions. Pt is encouraged to eat a Mediterranean-influenced diet, one that  includes lots of healthy foods like whole grains, fruits, vegetables, seafood, beans, and nuts.   3. Minimize episodes of shortness of breath (SOB) or fatigue by end of program [x] initial  [] met                  [] not met  [] progressing Pt will verbalize being able to complete their usual activities of daily living without feeling SOB or fatigued.

## 2024-01-19 ENCOUNTER — HOSPITAL ENCOUNTER (OUTPATIENT)
Facility: HOSPITAL | Age: 74
Setting detail: RECURRING SERIES
Discharge: HOME | End: 2024-01-22
Payer: MEDICARE

## 2024-01-19 VITALS — WEIGHT: 160 LBS | BODY MASS INDEX: 28.34 KG/M2

## 2024-01-19 PROCEDURE — 93798 PHYS/QHP OP CAR RHAB W/ECG: CPT

## 2024-01-19 ASSESSMENT — EXERCISE STRESS TEST
PEAK_RPE: 13
PEAK_HR: 103

## 2024-01-22 ENCOUNTER — APPOINTMENT (OUTPATIENT)
Facility: HOSPITAL | Age: 74
End: 2024-01-22
Payer: MEDICARE

## 2024-01-24 ENCOUNTER — APPOINTMENT (OUTPATIENT)
Facility: HOSPITAL | Age: 74
End: 2024-01-24
Payer: MEDICARE

## 2024-01-26 ENCOUNTER — APPOINTMENT (OUTPATIENT)
Facility: HOSPITAL | Age: 74
End: 2024-01-26
Payer: MEDICARE

## 2024-02-09 ENCOUNTER — TELEPHONE (OUTPATIENT)
Facility: HOSPITAL | Age: 74
End: 2024-02-09

## 2024-02-09 NOTE — TELEPHONE ENCOUNTER
Called patient 02/09/24 at 11:57PM. Regarding attendance and interest in continuing CR program as patient has not been in for exercise therapy since 1/19/24. Patient stated very interested to continue, has been feeling under the weather the past few weeks. Patient hopes to return to exercise as scheduled next week. Patient stated will call if unable to attend.       Dora Green  2/9/24 12:08 PM

## 2024-02-23 ENCOUNTER — TELEPHONE (OUTPATIENT)
Facility: HOSPITAL | Age: 74
End: 2024-02-23

## 2024-02-23 NOTE — TELEPHONE ENCOUNTER
Spoke with patient via telephone regarding discharge. Informed patient of discharge due to length of time since last visit and need for new intake should patient wish to return. Patient voiced understanding and eagerness to return to the program. Offered to schedule new intake for patient. Patient not interested in scheduling appointment at this time, but stated she would call \"in a week or so\" to schedule intake.     JAVIER Henry  2:06 PM 02/23/24

## 2024-08-02 ENCOUNTER — HOSPITAL ENCOUNTER (OUTPATIENT)
Facility: HOSPITAL | Age: 74
Setting detail: SPECIMEN
Discharge: HOME | End: 2024-08-05
Payer: MEDICARE

## 2024-08-02 PROCEDURE — 88305 TISSUE EXAM BY PATHOLOGIST: CPT

## 2024-08-22 NOTE — PROGRESS NOTES
Patient: Jeannine Finney                MRN: [id number]       SSN: [id number]  YOB: 1950        AGE: 74 y.o.        SEX: female      PCP: Analia Owens PA  08/26/24    Chief Complaint   Patient presents with    Knee Pain     right     HISTORY:  Jeannine Finney is a 74 y.o. female seen for several year history of bilateral knee pain.  She denies any recent injury.  She feels pain with standing, walking and stair climbing.  She experiences startup pain after sitting. She has responded to previous cortisone and visco supplementation injections. She was told that she is not a candidate for surgery due to h/o congestive heart failure and amyloidosis. She feels some pain radiating all the way down her right leg from her back. She sees Dr. Finley at [facility] for sciatica.     She was previously seen by Dr. Dorman for right knee pain.     She underwent right knee arthroscopy by Dr. Duong 10 years ago.     Occupation, etc: Ms. Finney is retired. She previously worked as a  for [employer]. She lives in [place] with her . She has 1 adult adult daughter, 1 adult son, and a 11 yo granddaughter. She weighs 170 lbs and is 5'3\". She states she is not very active due to her numerous other health problems.   Wt Readings from Last 3 Encounters:   08/26/24 77.1 kg (170 lb)   01/19/24 72.6 kg (160 lb)   01/11/24 73.9 kg (163 lb)      Body mass index is 30.11 kg/m².    Patient Active Problem List   Diagnosis    Chronic combined systolic and diastolic heart failure (HCC)    Paroxysmal supraventricular tachycardia (HCC)    Essential hypertension, benign    Palpitations    Mitral valve insufficiency and aortic valve insufficiency       Social History     Tobacco Use    Smoking status: Never    Smokeless tobacco: Never   Substance Use Topics    Alcohol use: No    Drug use: No        Allergies   Allergen Reactions    Latex Rash     PT DENIES

## 2024-08-26 ENCOUNTER — OFFICE VISIT (OUTPATIENT)
Age: 74
End: 2024-08-26
Payer: MEDICARE

## 2024-08-26 VITALS — WEIGHT: 170 LBS | BODY MASS INDEX: 30.12 KG/M2 | HEIGHT: 63 IN | TEMPERATURE: 97.5 F

## 2024-08-26 DIAGNOSIS — G89.29 CHRONIC PAIN OF RIGHT KNEE: Primary | ICD-10-CM

## 2024-08-26 DIAGNOSIS — M25.561 CHRONIC PAIN OF RIGHT KNEE: Primary | ICD-10-CM

## 2024-08-26 DIAGNOSIS — M17.11 UNILATERAL PRIMARY OSTEOARTHRITIS, RIGHT KNEE: ICD-10-CM

## 2024-08-26 DIAGNOSIS — M17.12 UNILATERAL PRIMARY OSTEOARTHRITIS, LEFT KNEE: ICD-10-CM

## 2024-08-26 DIAGNOSIS — M25.562 CHRONIC PAIN OF LEFT KNEE: ICD-10-CM

## 2024-08-26 DIAGNOSIS — G89.29 CHRONIC PAIN OF LEFT KNEE: ICD-10-CM

## 2024-08-26 PROCEDURE — 3017F COLORECTAL CA SCREEN DOC REV: CPT | Performed by: SPECIALIST

## 2024-08-26 PROCEDURE — G8400 PT W/DXA NO RESULTS DOC: HCPCS | Performed by: SPECIALIST

## 2024-08-26 PROCEDURE — 1123F ACP DISCUSS/DSCN MKR DOCD: CPT | Performed by: SPECIALIST

## 2024-08-26 PROCEDURE — 1036F TOBACCO NON-USER: CPT | Performed by: SPECIALIST

## 2024-08-26 PROCEDURE — 1090F PRES/ABSN URINE INCON ASSESS: CPT | Performed by: SPECIALIST

## 2024-08-26 PROCEDURE — 73562 X-RAY EXAM OF KNEE 3: CPT | Performed by: SPECIALIST

## 2024-08-26 PROCEDURE — 99203 OFFICE O/P NEW LOW 30 MIN: CPT | Performed by: SPECIALIST

## 2024-08-26 PROCEDURE — 20610 DRAIN/INJ JOINT/BURSA W/O US: CPT | Performed by: SPECIALIST

## 2024-08-26 PROCEDURE — G8417 CALC BMI ABV UP PARAM F/U: HCPCS | Performed by: SPECIALIST

## 2024-08-26 PROCEDURE — G8427 DOCREV CUR MEDS BY ELIG CLIN: HCPCS | Performed by: SPECIALIST

## 2024-08-26 RX ORDER — BUPIVACAINE HYDROCHLORIDE 5 MG/ML
4 INJECTION, SOLUTION PERINEURAL ONCE
Status: COMPLETED | OUTPATIENT
Start: 2024-08-26 | End: 2024-08-26

## 2024-08-26 RX ORDER — BETAMETHASONE SODIUM PHOSPHATE AND BETAMETHASONE ACETATE 3; 3 MG/ML; MG/ML
3 INJECTION, SUSPENSION INTRA-ARTICULAR; INTRALESIONAL; INTRAMUSCULAR; SOFT TISSUE ONCE
Status: COMPLETED | OUTPATIENT
Start: 2024-08-26 | End: 2024-08-26

## 2024-08-26 RX ADMIN — BUPIVACAINE HYDROCHLORIDE 20 MG: 5 INJECTION, SOLUTION PERINEURAL at 15:03

## 2024-08-26 RX ADMIN — BETAMETHASONE SODIUM PHOSPHATE AND BETAMETHASONE ACETATE 3 MG: 3; 3 INJECTION, SUSPENSION INTRA-ARTICULAR; INTRALESIONAL; INTRAMUSCULAR; SOFT TISSUE at 15:01

## 2024-08-26 RX ADMIN — BETAMETHASONE SODIUM PHOSPHATE AND BETAMETHASONE ACETATE 3 MG: 3; 3 INJECTION, SUSPENSION INTRA-ARTICULAR; INTRALESIONAL; INTRAMUSCULAR; SOFT TISSUE at 15:02

## 2024-09-26 ENCOUNTER — OFFICE VISIT (OUTPATIENT)
Age: 74
End: 2024-09-26
Payer: MEDICARE

## 2024-09-26 VITALS — WEIGHT: 170 LBS | HEIGHT: 63 IN | BODY MASS INDEX: 30.12 KG/M2 | TEMPERATURE: 97.8 F

## 2024-09-26 DIAGNOSIS — M17.11 UNILATERAL PRIMARY OSTEOARTHRITIS, RIGHT KNEE: Primary | ICD-10-CM

## 2024-09-26 DIAGNOSIS — M17.12 UNILATERAL PRIMARY OSTEOARTHRITIS, LEFT KNEE: ICD-10-CM

## 2024-09-26 PROCEDURE — 20610 DRAIN/INJ JOINT/BURSA W/O US: CPT | Performed by: SPECIALIST

## 2024-09-26 RX ORDER — HYALURONATE SODIUM 10 MG/ML
20 SYRINGE (ML) INTRAARTICULAR ONCE
Status: COMPLETED | OUTPATIENT
Start: 2024-09-26 | End: 2024-09-26

## 2024-09-26 RX ADMIN — Medication 20 MG: at 10:54

## 2024-09-26 RX ADMIN — Medication 20 MG: at 10:53

## 2024-09-30 NOTE — PROGRESS NOTES
Patient: Jeannine Finney                MRN: [id number]       SSN: [id number]  YOB: 1950        AGE: 74 y.o.        SEX: female  Body mass index is 30.11 kg/m².    PCP: Analia Owens PA  10/03/24    Chief Complaint   Patient presents with    Knee Pain     Bilateral knee euflexxa #2      HISTORY:  Jeannine Finney is a 74 y.o. female who is seen for bilateral knee pain. She presents today for her second injection in the Euflexxa visco supplementation series.      ICD-10-CM    1. Unilateral primary osteoarthritis, right knee  M17.11 DRAIN/INJECT LARGE JOINT/BURSA     sodium hyaluronate (EUFLEXXA, HYALGAN) injection 20 mg      2. Unilateral primary osteoarthritis, left knee  M17.12 DRAIN/INJECT LARGE JOINT/BURSA     sodium hyaluronate (EUFLEXXA, HYALGAN) injection 20 mg         PROCEDURE:  Ms. Finney's knees injected with 2 cc of Euflexxa.     Chart reviewed for the following:   Rikki CORTES MD, have reviewed the History, Physical and updated the Allergic reactions for Jeannine Finney     TIME OUT performed immediately prior to start of procedure:  Rikki CORTES MD, have performed the following reviews on Jeannine Finney prior to the start of the procedure:            * Patient was identified by name and date of birth   * Agreement on procedure being performed was verified  * Risks and Benefits explained to the patient  * Procedure site verified and marked as necessary  * Patient was positioned for comfort  * Consent was obtained     Time: 10:42 AM     Date of procedure: 10/3/2024    Procedure performed by:  Rikki Morrell MD    Ms. Finney tolerated the procedure well with no complications.    PLAN: Ms. Finnye's knees injected with 2 cc of Euflexxa. Ms. Finney will follow up in one week to complete her visco supplementation injection series.    Documentation by edilia Gonzalez, as documented by Rikki Morrell MD.

## 2024-10-03 ENCOUNTER — OFFICE VISIT (OUTPATIENT)
Age: 74
End: 2024-10-03
Payer: MEDICARE

## 2024-10-03 VITALS — WEIGHT: 170 LBS | TEMPERATURE: 97 F | HEIGHT: 63 IN | BODY MASS INDEX: 30.12 KG/M2

## 2024-10-03 DIAGNOSIS — M17.12 UNILATERAL PRIMARY OSTEOARTHRITIS, LEFT KNEE: ICD-10-CM

## 2024-10-03 DIAGNOSIS — M17.11 UNILATERAL PRIMARY OSTEOARTHRITIS, RIGHT KNEE: Primary | ICD-10-CM

## 2024-10-03 PROCEDURE — 20610 DRAIN/INJ JOINT/BURSA W/O US: CPT | Performed by: SPECIALIST

## 2024-10-03 RX ORDER — HYALURONATE SODIUM 10 MG/ML
20 SYRINGE (ML) INTRAARTICULAR ONCE
Status: COMPLETED | OUTPATIENT
Start: 2024-10-03 | End: 2024-10-03

## 2024-10-03 RX ADMIN — Medication 20 MG: at 10:46

## 2024-10-10 ENCOUNTER — OFFICE VISIT (OUTPATIENT)
Age: 74
End: 2024-10-10
Payer: MEDICARE

## 2024-10-10 VITALS — TEMPERATURE: 97 F | WEIGHT: 170 LBS | HEIGHT: 63 IN | BODY MASS INDEX: 30.12 KG/M2

## 2024-10-10 DIAGNOSIS — M17.11 UNILATERAL PRIMARY OSTEOARTHRITIS, RIGHT KNEE: Primary | ICD-10-CM

## 2024-10-10 DIAGNOSIS — M17.12 UNILATERAL PRIMARY OSTEOARTHRITIS, LEFT KNEE: ICD-10-CM

## 2024-10-10 PROCEDURE — 20610 DRAIN/INJ JOINT/BURSA W/O US: CPT | Performed by: SPECIALIST

## 2024-10-10 RX ORDER — HYALURONATE SODIUM 10 MG/ML
20 SYRINGE (ML) INTRAARTICULAR ONCE
Status: COMPLETED | OUTPATIENT
Start: 2024-10-10 | End: 2024-10-10

## 2024-10-10 RX ADMIN — Medication 20 MG: at 13:49

## 2024-10-10 RX ADMIN — Medication 20 MG: at 13:48

## 2024-10-10 NOTE — PROGRESS NOTES
Patient: Jeannine Finney                MRN: [id number]       SSN: [id number]  YOB: 1950        AGE: 74 y.o.        SEX: female  Body mass index is 30.11 kg/m².    PCP: Analia Owens PA  10/10/24    Chief Complaint   Patient presents with    Knee Pain     Bilateral knee euflexxa #3     HISTORY:  Jeannine Finney is a 74 y.o. female who is seen for bilateral knee pain. She presents today for her third injection in the Euflexxa visco supplementation series.    PROCEDURE:  Ms. Johnson knees injected with 2 cc of Euflexxa.     TIME OUT performed immediately prior to start of procedure:  I, Rikki Morrell MD, have performed the following reviews on Jeannine Finney prior to the start of the procedure:            * Patient was identified by name and date of birth   * Agreement on procedure being performed was verified  * Risks and Benefits explained to the patient  * Procedure site verified and marked as necessary  * Patient was positioned for comfort  * Consent was obtained     Time: 1:47 PM     Date of procedure: 10/10/2024    Procedure performed by:  Rikki Morrell MD    Ms. Finney tolerated the procedure well with no complications      ICD-10-CM    1. Unilateral primary osteoarthritis, right knee  M17.11 DRAIN/INJECT LARGE JOINT/BURSA     sodium hyaluronate (EUFLEXXA, HYALGAN) injection 20 mg      2. Unilateral primary osteoarthritis, left knee  M17.12 DRAIN/INJECT LARGE JOINT/BURSA     sodium hyaluronate (EUFLEXXA, HYALGAN) injection 20 mg        PLAN: Ms. Finney's knees injected with 2 cc of Euflexxa. Ms. Finney will follow up PRN now that she has completed her visco supplementation injection series.     Documentation by edilia Gonzalez, as documented by Rikki Morrell MD.

## 2024-10-18 ENCOUNTER — HOSPITAL ENCOUNTER (OUTPATIENT)
Facility: HOSPITAL | Age: 74
Setting detail: SPECIMEN
Discharge: HOME | End: 2024-10-21
Payer: MEDICARE

## 2024-10-18 PROCEDURE — 88305 TISSUE EXAM BY PATHOLOGIST: CPT

## 2024-10-18 PROCEDURE — 88331 PATH CONSLTJ SURG 1 BLK 1SPC: CPT

## 2025-07-01 ENCOUNTER — TRANSCRIBE ORDERS (OUTPATIENT)
Facility: HOSPITAL | Age: 75
End: 2025-07-01

## 2025-07-01 DIAGNOSIS — Z12.31 ENCOUNTER FOR SCREENING MAMMOGRAM FOR MALIGNANT NEOPLASM OF BREAST: Primary | ICD-10-CM

## 2025-07-16 NOTE — PROGRESS NOTES
BUPivacaine (MARCAINE) 0.5 % injection 20 mg  4 mL Intra-artICUlar Once        PHYSICAL EXAMINATION:  There were no vitals taken for this visit.     ORTHO EXAMINATION:  Examination Right knee Left knee   Skin Intact Intact   Range of motion 110-0 120-0   Effusion - -   Medial joint line tenderness + -   Lateral joint line tenderness - -   Popliteal tenderness - -   Osteophytes palpable + +   Gabriel’s - -   Patella crepitus + +   Anterior drawer - -   Lateral laxity - -   Medial laxity - -   Varus deformity - -   Valgus deformity - -   Pretibial edema - -   Calf tenderness - -   Ambulates with a rollator walker    RADIOGRAPHS:   08/26/24  3 VIEWS RT KNEE MICHELLE  Three views of right knee: no fractures, no effusion, Kellgren Anup grade 4 with severe joint space narrowing (R>L), no osteophytes present. Valgus deformity on the right.      1/28/20 XR LT KNEE 3V MICHELLE SIL CORTES independently reviewed these images today.    PROCEDURE: Right knee injected 4 cc 0.25% Marcaine and 0.5 cc (3 mg) Celestone.  Chart reviewed for the following:   IRikki MD, have reviewed the History, Physical and updated the Allergic reactions for Jeannine Finney     TIME OUT performed immediately prior to start of procedure:  Rikki CORTES MD, have performed the following reviews on Jeannine Finney prior to the start of the procedure:            * Patient was identified by name and date of birth   * Agreement on procedure being performed was verified  * Risks and Benefits explained to the patient  * Procedure site verified and marked as necessary  * Patient was positioned for comfort  * Consent was obtained  Time: 9:52 AM  Date of procedure: 7/17/2025    Procedure performed by:  Dr. Morrell    Ms. Finney tolerated the procedure well with no complications.    IMPRESSION:      ICD-10-CM    1. Chronic pain of right knee  M25.561 DRAIN/INJECT LARGE JOINT/BURSA    G89.29 betamethasone acetate-betamethasone

## 2025-07-17 ENCOUNTER — TRANSCRIBE ORDERS (OUTPATIENT)
Facility: HOSPITAL | Age: 75
End: 2025-07-17

## 2025-07-17 ENCOUNTER — OFFICE VISIT (OUTPATIENT)
Age: 75
End: 2025-07-17

## 2025-07-17 DIAGNOSIS — M25.562 CHRONIC PAIN OF LEFT KNEE: ICD-10-CM

## 2025-07-17 DIAGNOSIS — R92.8 ABNORMAL MAMMOGRAM: Primary | ICD-10-CM

## 2025-07-17 DIAGNOSIS — M17.12 UNILATERAL PRIMARY OSTEOARTHRITIS, LEFT KNEE: ICD-10-CM

## 2025-07-17 DIAGNOSIS — M25.561 CHRONIC PAIN OF RIGHT KNEE: Primary | ICD-10-CM

## 2025-07-17 DIAGNOSIS — M17.11 UNILATERAL PRIMARY OSTEOARTHRITIS, RIGHT KNEE: ICD-10-CM

## 2025-07-17 DIAGNOSIS — G89.29 CHRONIC PAIN OF RIGHT KNEE: Primary | ICD-10-CM

## 2025-07-17 DIAGNOSIS — G89.29 CHRONIC PAIN OF LEFT KNEE: ICD-10-CM

## 2025-07-17 RX ORDER — BETAMETHASONE SODIUM PHOSPHATE AND BETAMETHASONE ACETATE 3; 3 MG/ML; MG/ML
3 INJECTION, SUSPENSION INTRA-ARTICULAR; INTRALESIONAL; INTRAMUSCULAR; SOFT TISSUE ONCE
Status: COMPLETED | OUTPATIENT
Start: 2025-07-17 | End: 2025-07-17

## 2025-07-17 RX ORDER — BUPIVACAINE HYDROCHLORIDE 5 MG/ML
4 INJECTION, SOLUTION PERINEURAL ONCE
Status: COMPLETED | OUTPATIENT
Start: 2025-07-17 | End: 2025-07-17

## 2025-07-17 RX ADMIN — BUPIVACAINE HYDROCHLORIDE 20 MG: 5 INJECTION, SOLUTION PERINEURAL at 09:53

## 2025-07-17 RX ADMIN — BETAMETHASONE SODIUM PHOSPHATE AND BETAMETHASONE ACETATE 3 MG: 3; 3 INJECTION, SUSPENSION INTRA-ARTICULAR; INTRALESIONAL; INTRAMUSCULAR; SOFT TISSUE at 09:53
